# Patient Record
Sex: MALE | Race: WHITE | NOT HISPANIC OR LATINO | URBAN - METROPOLITAN AREA
[De-identification: names, ages, dates, MRNs, and addresses within clinical notes are randomized per-mention and may not be internally consistent; named-entity substitution may affect disease eponyms.]

---

## 2023-08-29 ENCOUNTER — OFFICE VISIT (OUTPATIENT)
Dept: PRIMARY CARE | Facility: CLINIC | Age: 38
End: 2023-08-29
Payer: COMMERCIAL

## 2023-08-29 ENCOUNTER — LAB (OUTPATIENT)
Dept: LAB | Facility: LAB | Age: 38
End: 2023-08-29
Payer: COMMERCIAL

## 2023-08-29 VITALS
BODY MASS INDEX: 31.95 KG/M2 | SYSTOLIC BLOOD PRESSURE: 133 MMHG | OXYGEN SATURATION: 96 % | HEIGHT: 74 IN | WEIGHT: 249 LBS | DIASTOLIC BLOOD PRESSURE: 72 MMHG | HEART RATE: 74 BPM | TEMPERATURE: 96.8 F

## 2023-08-29 DIAGNOSIS — G47.9 DIFFICULTY SLEEPING: ICD-10-CM

## 2023-08-29 DIAGNOSIS — Z76.89 ENCOUNTER TO ESTABLISH CARE: ICD-10-CM

## 2023-08-29 DIAGNOSIS — Z00.00 HEALTHCARE MAINTENANCE: ICD-10-CM

## 2023-08-29 DIAGNOSIS — F41.9 ANXIETY: ICD-10-CM

## 2023-08-29 DIAGNOSIS — F41.9 ANXIETY: Primary | ICD-10-CM

## 2023-08-29 PROCEDURE — 84443 ASSAY THYROID STIM HORMONE: CPT

## 2023-08-29 PROCEDURE — 81003 URINALYSIS AUTO W/O SCOPE: CPT

## 2023-08-29 PROCEDURE — 36415 COLL VENOUS BLD VENIPUNCTURE: CPT

## 2023-08-29 PROCEDURE — 99204 OFFICE O/P NEW MOD 45 MIN: CPT | Performed by: STUDENT IN AN ORGANIZED HEALTH CARE EDUCATION/TRAINING PROGRAM

## 2023-08-29 PROCEDURE — 80061 LIPID PANEL: CPT

## 2023-08-29 PROCEDURE — 80053 COMPREHEN METABOLIC PANEL: CPT

## 2023-08-29 PROCEDURE — 82306 VITAMIN D 25 HYDROXY: CPT

## 2023-08-29 PROCEDURE — 85025 COMPLETE CBC W/AUTO DIFF WBC: CPT

## 2023-08-29 PROCEDURE — 1036F TOBACCO NON-USER: CPT | Performed by: STUDENT IN AN ORGANIZED HEALTH CARE EDUCATION/TRAINING PROGRAM

## 2023-08-29 PROCEDURE — 84439 ASSAY OF FREE THYROXINE: CPT

## 2023-08-29 ASSESSMENT — ANXIETY QUESTIONNAIRES
GAD7 TOTAL SCORE: 10
7. FEELING AFRAID AS IF SOMETHING AWFUL MIGHT HAPPEN: SEVERAL DAYS
1. FEELING NERVOUS, ANXIOUS, OR ON EDGE: SEVERAL DAYS
6. BECOMING EASILY ANNOYED OR IRRITABLE: SEVERAL DAYS
IF YOU CHECKED OFF ANY PROBLEMS ON THIS QUESTIONNAIRE, HOW DIFFICULT HAVE THESE PROBLEMS MADE IT FOR YOU TO DO YOUR WORK, TAKE CARE OF THINGS AT HOME, OR GET ALONG WITH OTHER PEOPLE: VERY DIFFICULT
3. WORRYING TOO MUCH ABOUT DIFFERENT THINGS: SEVERAL DAYS
4. TROUBLE RELAXING: NEARLY EVERY DAY
5. BEING SO RESTLESS THAT IT IS HARD TO SIT STILL: NEARLY EVERY DAY
2. NOT BEING ABLE TO STOP OR CONTROL WORRYING: NOT AT ALL

## 2023-08-29 ASSESSMENT — ENCOUNTER SYMPTOMS
NAUSEA: 0
CHILLS: 0
FATIGUE: 0
SHORTNESS OF BREATH: 0
FREQUENCY: 0
HEADACHES: 0
COUGH: 0
PALPITATIONS: 0
DIZZINESS: 0
CONSTIPATION: 0
FEVER: 0
MYALGIAS: 0
NERVOUS/ANXIOUS: 1
RHINORRHEA: 0
VOMITING: 0
DIARRHEA: 0
LIGHT-HEADEDNESS: 0
ARTHRALGIAS: 0
ABDOMINAL PAIN: 0

## 2023-08-29 NOTE — PROGRESS NOTES
"Subjective   Patient ID: Fredi Rios is a 38 y.o. male who presents for [facility].    HPI     Patient is presenting to [facility].  Patient has been having increased anxiety that has been building up for some time.  He wanted to discuss options in regards to treatment and if there were any specifics that would be recommended for him.  He states that a lot of it has to deal with his job and uncertainty in the market.  Patient is also had some increased stress at home with his wife and children.  Patient has been in counseling in the past specifically for his anxiety as well as counseling with both him and his wife.  Overall things are stable but he still having a lot of anxious symptoms.  He feels that it is definitely affecting his sleep as well as his regular day-to-day activities.  Patient does have a history of marijuana use and has resorted to that at times here again which also causes friction with his overall stress levels.  He states that overall this does help but would like to know what alternatives would be for treatment for him.  Patient has not seen a doctor in quite some time.  He has not had any recent lab work.    Review of Systems   Constitutional:  Negative for chills, fatigue and fever.   HENT:  Negative for congestion and rhinorrhea.    Respiratory:  Negative for cough and shortness of breath.    Cardiovascular:  Negative for chest pain and palpitations.   Gastrointestinal:  Negative for abdominal pain, constipation, diarrhea, nausea and vomiting.   Genitourinary:  Negative for frequency.   Musculoskeletal:  Negative for arthralgias and myalgias.   Neurological:  Negative for dizziness, light-headedness and headaches.   Psychiatric/Behavioral:  The patient is nervous/anxious.        Objective   /72   Pulse 74   Temp 36 °C (96.8 °F)   Ht 1.886 m (6' 2.25\")   Wt 113 kg (249 lb)   SpO2 96%   BMI 31.75 kg/m²     Physical Exam  Vitals and nursing note reviewed.   Constitutional:  "      General: He is not in acute distress.     Appearance: Normal appearance. He is not ill-appearing or toxic-appearing.   Cardiovascular:      Rate and Rhythm: Normal rate and regular rhythm.      Heart sounds: Normal heart sounds.   Pulmonary:      Effort: Pulmonary effort is normal.      Breath sounds: Normal breath sounds.   Abdominal:      General: Bowel sounds are normal.      Palpations: Abdomen is soft.   Neurological:      Mental Status: He is alert.   Psychiatric:         Mood and Affect: Mood is anxious.         Assessment/Plan   Problem List Items Addressed This Visit    None  Visit Diagnoses       Anxiety    -  Primary    Relevant Orders    Thyroid Stimulating Hormone (Completed)    Thyroxine, Free (Completed)    Difficulty sleeping        Encounter to establish care        Healthcare maintenance        Relevant Orders    CBC and Auto Differential (Completed)    Comprehensive Metabolic Panel (Completed)    Lipid Panel (Completed)    Urinalysis with Reflex Microscopic (Completed)    Vitamin D, Total (Completed)    Thyroid Stimulating Hormone (Completed)    Thyroxine, Free (Completed)          History and physical examination as above.  Patient presenting to establish care.  Patient with significant anxiety as well as difficulty sleeping.  History as above.  Discussed multiple options including recommendation for continued counseling as well as for medical and medication therapy.  Current LAKEISHA-7 score of 10 with rating very difficult for regular daily activities.  Discussed medications that would be recommended.  Patient would like to defer on any medical treatment for now.  He is debating going back into counseling.  Patient is agreeable to getting lab work performed and these orders were placed.  Patient will have these performed prior to his next appointment for a wellness exam in about 1 month.  Patient will call if he wants to go on any medications in the meantime.  Patient will continue with regular  follow-up or follow-up sooner for acute concerns or complaints.

## 2023-08-30 LAB
ALANINE AMINOTRANSFERASE (SGPT) (U/L) IN SER/PLAS: 22 U/L (ref 10–52)
ALBUMIN (G/DL) IN SER/PLAS: 4.6 G/DL (ref 3.4–5)
ALKALINE PHOSPHATASE (U/L) IN SER/PLAS: 45 U/L (ref 33–120)
ANION GAP IN SER/PLAS: 12 MMOL/L (ref 10–20)
APPEARANCE, URINE: CLEAR
ASPARTATE AMINOTRANSFERASE (SGOT) (U/L) IN SER/PLAS: 20 U/L (ref 9–39)
BASOPHILS (10*3/UL) IN BLOOD BY AUTOMATED COUNT: 0.03 X10E9/L (ref 0–0.1)
BASOPHILS/100 LEUKOCYTES IN BLOOD BY AUTOMATED COUNT: 0.6 % (ref 0–2)
BILIRUBIN TOTAL (MG/DL) IN SER/PLAS: 0.4 MG/DL (ref 0–1.2)
BILIRUBIN, URINE: NEGATIVE
BLOOD, URINE: NEGATIVE
CALCIDIOL (25 OH VITAMIN D3) (NG/ML) IN SER/PLAS: 40 NG/ML
CALCIUM (MG/DL) IN SER/PLAS: 9.9 MG/DL (ref 8.6–10.6)
CARBON DIOXIDE, TOTAL (MMOL/L) IN SER/PLAS: 28 MMOL/L (ref 21–32)
CHLORIDE (MMOL/L) IN SER/PLAS: 104 MMOL/L (ref 98–107)
CHOLESTEROL (MG/DL) IN SER/PLAS: 206 MG/DL (ref 0–199)
CHOLESTEROL IN HDL (MG/DL) IN SER/PLAS: 56.2 MG/DL
CHOLESTEROL/HDL RATIO: 3.7
COLOR, URINE: NORMAL
CREATININE (MG/DL) IN SER/PLAS: 1.07 MG/DL (ref 0.5–1.3)
EOSINOPHILS (10*3/UL) IN BLOOD BY AUTOMATED COUNT: 0.18 X10E9/L (ref 0–0.7)
EOSINOPHILS/100 LEUKOCYTES IN BLOOD BY AUTOMATED COUNT: 3.5 % (ref 0–6)
ERYTHROCYTE DISTRIBUTION WIDTH (RATIO) BY AUTOMATED COUNT: 12.2 % (ref 11.5–14.5)
ERYTHROCYTE MEAN CORPUSCULAR HEMOGLOBIN CONCENTRATION (G/DL) BY AUTOMATED: 33.1 G/DL (ref 32–36)
ERYTHROCYTE MEAN CORPUSCULAR VOLUME (FL) BY AUTOMATED COUNT: 92 FL (ref 80–100)
ERYTHROCYTES (10*6/UL) IN BLOOD BY AUTOMATED COUNT: 4.91 X10E12/L (ref 4.5–5.9)
GFR MALE: 90 ML/MIN/1.73M2
GLUCOSE (MG/DL) IN SER/PLAS: 89 MG/DL (ref 74–99)
GLUCOSE, URINE: NEGATIVE MG/DL
HEMATOCRIT (%) IN BLOOD BY AUTOMATED COUNT: 45.3 % (ref 41–52)
HEMOGLOBIN (G/DL) IN BLOOD: 15 G/DL (ref 13.5–17.5)
IMMATURE GRANULOCYTES/100 LEUKOCYTES IN BLOOD BY AUTOMATED COUNT: 0.2 % (ref 0–0.9)
KETONES, URINE: NEGATIVE MG/DL
LDL: 137 MG/DL (ref 0–99)
LEUKOCYTE ESTERASE, URINE: NEGATIVE
LEUKOCYTES (10*3/UL) IN BLOOD BY AUTOMATED COUNT: 5.1 X10E9/L (ref 4.4–11.3)
LYMPHOCYTES (10*3/UL) IN BLOOD BY AUTOMATED COUNT: 1.99 X10E9/L (ref 1.2–4.8)
LYMPHOCYTES/100 LEUKOCYTES IN BLOOD BY AUTOMATED COUNT: 38.8 % (ref 13–44)
MONOCYTES (10*3/UL) IN BLOOD BY AUTOMATED COUNT: 0.44 X10E9/L (ref 0.1–1)
MONOCYTES/100 LEUKOCYTES IN BLOOD BY AUTOMATED COUNT: 8.6 % (ref 2–10)
NEUTROPHILS (10*3/UL) IN BLOOD BY AUTOMATED COUNT: 2.48 X10E9/L (ref 1.2–7.7)
NEUTROPHILS/100 LEUKOCYTES IN BLOOD BY AUTOMATED COUNT: 48.3 % (ref 40–80)
NITRITE, URINE: NEGATIVE
NRBC (PER 100 WBCS) BY AUTOMATED COUNT: 0 /100 WBC (ref 0–0)
PH, URINE: 6 (ref 5–8)
PLATELETS (10*3/UL) IN BLOOD AUTOMATED COUNT: 241 X10E9/L (ref 150–450)
POTASSIUM (MMOL/L) IN SER/PLAS: 4.6 MMOL/L (ref 3.5–5.3)
PROTEIN TOTAL: 7.3 G/DL (ref 6.4–8.2)
PROTEIN, URINE: NEGATIVE MG/DL
SODIUM (MMOL/L) IN SER/PLAS: 139 MMOL/L (ref 136–145)
SPECIFIC GRAVITY, URINE: 1.01 (ref 1–1.03)
THYROTROPIN (MIU/L) IN SER/PLAS BY DETECTION LIMIT <= 0.05 MIU/L: 2.32 MIU/L (ref 0.44–3.98)
THYROXINE (T4) FREE (NG/DL) IN SER/PLAS: 0.97 NG/DL (ref 0.78–1.48)
TRIGLYCERIDE (MG/DL) IN SER/PLAS: 66 MG/DL (ref 0–149)
UREA NITROGEN (MG/DL) IN SER/PLAS: 18 MG/DL (ref 6–23)
UROBILINOGEN, URINE: <2 MG/DL (ref 0–1.9)
VLDL: 13 MG/DL (ref 0–40)

## 2023-09-12 ENCOUNTER — LAB (OUTPATIENT)
Dept: LAB | Facility: LAB | Age: 38
End: 2023-09-12
Payer: COMMERCIAL

## 2023-09-13 LAB — TOBACCO SCREEN, URINE: NEGATIVE

## 2024-01-15 ENCOUNTER — OFFICE VISIT (OUTPATIENT)
Dept: PRIMARY CARE | Facility: CLINIC | Age: 39
End: 2024-01-15
Payer: COMMERCIAL

## 2024-01-15 VITALS
DIASTOLIC BLOOD PRESSURE: 84 MMHG | WEIGHT: 248 LBS | OXYGEN SATURATION: 97 % | SYSTOLIC BLOOD PRESSURE: 124 MMHG | HEART RATE: 76 BPM | HEIGHT: 75 IN | BODY MASS INDEX: 30.84 KG/M2 | TEMPERATURE: 97.5 F

## 2024-01-15 DIAGNOSIS — R41.840 DECREASED ATTENTION SPAN: ICD-10-CM

## 2024-01-15 DIAGNOSIS — E78.2 MIXED HYPERLIPIDEMIA: Primary | ICD-10-CM

## 2024-01-15 DIAGNOSIS — R41.840 DIFFICULTY CONCENTRATING: ICD-10-CM

## 2024-01-15 PROCEDURE — 1036F TOBACCO NON-USER: CPT | Performed by: STUDENT IN AN ORGANIZED HEALTH CARE EDUCATION/TRAINING PROGRAM

## 2024-01-15 PROCEDURE — 99213 OFFICE O/P EST LOW 20 MIN: CPT | Performed by: STUDENT IN AN ORGANIZED HEALTH CARE EDUCATION/TRAINING PROGRAM

## 2024-01-15 ASSESSMENT — ENCOUNTER SYMPTOMS
CHILLS: 0
FATIGUE: 0
DECREASED CONCENTRATION: 1
FEVER: 0
ARTHRALGIAS: 0
ABDOMINAL PAIN: 0
NAUSEA: 0
NERVOUS/ANXIOUS: 0
MYALGIAS: 0
VOMITING: 0
SHORTNESS OF BREATH: 0
DIARRHEA: 0
HYPERACTIVE: 0

## 2024-01-15 NOTE — PROGRESS NOTES
"Subjective   Patient ID: Fredi Rios is a 38 y.o. male who presents for Establish Care (Adhd medication possibility).    HPI     He did end up quitting marijuana for the past few months.  He quit cigarettes about 2 weeks ago.  He states that this has really helped his overall anxiety in the short term.  He has been in counseling and at the time they thought that he had ADHD.  He has a hard time focusing on certain tasks and stuff at work.  He does not feel that this is the same anxiety that he had in the past.  Hard time paying attention when people are talking to him about something that he is not interested in. 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  He thinks his last evaluation for ADHD was in college but he was not on any medication.  He is interested in getting tested.      Review of Systems   Constitutional:  Negative for chills, fatigue and fever.   HENT:  Negative for congestion and postnasal drip.    Respiratory:  Negative for shortness of breath.    Cardiovascular:  Negative for chest pain.   Gastrointestinal:  Negative for abdominal pain, diarrhea, nausea and vomiting.   Musculoskeletal:  Negative for arthralgias and myalgias.   Psychiatric/Behavioral:  Positive for decreased concentration. The patient is not nervous/anxious and is not hyperactive.        Objective   /84   Pulse 76   Temp 36.4 °C (97.5 °F)   Ht 1.905 m (6' 3\")   Wt 112 kg (248 lb)   SpO2 97%   BMI 31.00 kg/m²     Physical Exam  Vitals and nursing note reviewed.   Constitutional:       General: He is not in acute distress.     Appearance: Normal appearance. He is normal weight. He is not ill-appearing or toxic-appearing.   HENT:      Head: Normocephalic and atraumatic.   Cardiovascular:      Rate and Rhythm: Normal rate and regular rhythm.      Heart sounds: Normal heart sounds.   Pulmonary:      Effort: Pulmonary effort is normal.      Breath sounds: Normal breath sounds.   Skin:     General: " Skin is warm and dry.   Neurological:      General: No focal deficit present.      Mental Status: He is alert. Mental status is at baseline. He is disoriented.      Cranial Nerves: No cranial nerve deficit.   Psychiatric:         Mood and Affect: Mood normal.         Behavior: Behavior normal.         Assessment/Plan   Problem List Items Addressed This Visit    None  Visit Diagnoses         Codes    Mixed hyperlipidemia    -  Primary E78.2    Relevant Orders    Lipid Panel    Difficulty concentrating     R41.840    Relevant Orders    Referral to Psychiatry    Decreased attention Span     R41.840    Relevant Orders    Referral to Psychiatry          History and physical examination as above.  Referral placed to psychiatry for testing for ADHD.  Patient did previously have a diagnosis of ADHD, but has not had any medications since he was last in college.  He has not been on any sort of recent medications.  Patient will continue with follow-up with them for this.  Also placed order for repeat cholesterol level to be done in March.  Patient will make sure that he is fasting prior to this test especially with changing of his diet recently.  Patient will otherwise continue with regular follow-up.  He will come in sooner for acute concerns or complaints.

## 2024-01-15 NOTE — PATIENT INSTRUCTIONS
I went ahead and put in an order for blood testing to be done in March.  Please make sure that you are fasting for this lab work.    I went ahead and put in a referral to psychiatry for testing for ADHD.  Please call in the next 1 to 2 weeks if you do not hear back from their office.    Please call with any additional questions or concerns.    Thank you

## 2024-01-23 ENCOUNTER — OFFICE VISIT (OUTPATIENT)
Dept: BEHAVIORAL HEALTH | Facility: CLINIC | Age: 39
End: 2024-01-23
Payer: COMMERCIAL

## 2024-01-23 VITALS
HEART RATE: 67 BPM | RESPIRATION RATE: 16 BRPM | WEIGHT: 240.3 LBS | DIASTOLIC BLOOD PRESSURE: 78 MMHG | SYSTOLIC BLOOD PRESSURE: 118 MMHG | TEMPERATURE: 98.3 F | HEIGHT: 75 IN | BODY MASS INDEX: 29.88 KG/M2

## 2024-01-23 DIAGNOSIS — R41.840 DECREASED ATTENTION SPAN: ICD-10-CM

## 2024-01-23 DIAGNOSIS — R41.840 DIFFICULTY CONCENTRATING: ICD-10-CM

## 2024-01-23 DIAGNOSIS — F90.9 ATTENTION DEFICIT HYPERACTIVITY DISORDER (ADHD), UNSPECIFIED ADHD TYPE: ICD-10-CM

## 2024-01-23 PROCEDURE — 99214 OFFICE O/P EST MOD 30 MIN: CPT | Performed by: PSYCHIATRY & NEUROLOGY

## 2024-01-23 PROCEDURE — 1036F TOBACCO NON-USER: CPT | Performed by: PSYCHIATRY & NEUROLOGY

## 2024-01-23 PROCEDURE — 80307 DRUG TEST PRSMV CHEM ANLYZR: CPT

## 2024-01-23 RX ORDER — METHYLPHENIDATE HYDROCHLORIDE 18 MG/1
18 TABLET ORAL DAILY
Qty: 30 TABLET | Refills: 0 | Status: SHIPPED | OUTPATIENT
Start: 2024-01-23 | End: 2024-01-24 | Stop reason: SDUPTHER

## 2024-01-23 NOTE — PROGRESS NOTES
"Outpatient Psychiatry    Subjective   Fredi Rios, a 38 y.o. male, presenting to Psychiatry for evaluation.  Patient is referred by Eusebio Isabel DO for possible ADHD.      Chief Complaint: \"I think I have had ADHD since I was young\"    HPI:    ADHD diagnosed as young child and took Ritalin as a child (ages 5-9)   Gets distracted, wife feels he is not paying attention, not very organized (had an assistant which helped a great deal with organization) which effects him at work, puts things off, difficulty sitting still   After second child marital issues started and he and his wife believe the issues surround his ADHD (I.e. 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         Psychiatric Review Of Systems:  Depressive Symptoms: negative  Manic Symptoms: negative  Anxiety Symptoms: Negative  Psychotic Symptoms: negative  Other Symptoms:    Current Medications:  No current outpatient medications on file.  Emile's  Wart, \"makes me feel better\"    Medical History:  None    All:  NKDA    Past Psychiatric History:   Diagnoses:  ADHD as young boy (ages [age]) college DX  ADHD, didn't follow through   Previous Psychiatrist: unknown  Therapy: couples counseling after 2nd child; still going to continue   Current psychiatric medications: none  Past psychiatric medications: ritalin as a child ages [age]  (remembers taking it twice a day)  Hospitalizations: none  Suicide attempts: none  Family psychiatric history: mother passed age 19, dad Lasik Eye Surgeon age 71   Siblings: two younger twin sisters, step sister      Social History:   Currently lives: with wife and three " children   Education: [employer]; stuggled academically, didn't go to class often, Major Microbiology, Minor in Chemistry   History of Learning Problem:  attention problems as child  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    OARRS:  No data recorded  I have personally reviewed the OARRS report for Fredi Rios. I have considered the risks of abuse, dependence, addiction and diversion and I believe that it is clinically appropriate for Fredi Rios to be prescribed this medication    Is the patient prescribed a combination of a benzodiazepine and opioid?  No    Last Urine Drug Screen / ordered today: Yes  No results found for this or any previous visit (from the past 8760 hour(s)).  Results pending (urine taken today)    Controlled Substance Agreement:  Date of the Last Agreement: today  Reviewed Controlled Substance Agreement including but not limited to the benefits, risks, and alternatives to treatment with a Controlled Substance medication(s).    Stimulants:   What is the patient's goal of therapy? Increased focus, increased organizational skills, increased attention to work  and  home life      Objective   Mental Status Exam  Appearance: Casually dressed, well-groomed  Attitude: Calm, cooperative, and engaged in conversation.  Behavior: Appropriate eye contact.   Motor Activity: No psychomotor  "agitation or retardation. No abnormal movements, tremors or tics. No evidence of extrapyramidal symptoms or tardive dyskinesia.  Speech: Regular rate, rhythm, volume. Spontaneous, no pressured speech.  Mood: \"pretty good\"  Affect: Euthymic, full range, mood congruent.  Thought Process: Linear, logical, and goal-directed. No loose associations or gross thought disorganization.  Thought Content: Denied current suicidal ideation or thoughts of harm to self, denied homicidal ideation or thoughts of harm to others. No delusional thinking elicited. No perseverations or obsessions identified.   Perception: Did not endorse auditory or visual hallucinations, did not appear to be responding to hallucinatory stimuli.   Cognition: Alert, oriented x3. Preserved attention span and concentration, recent and remote memory. Adequate fund of knowledge. No deficits in language.   Insight: Fair, in regards to understanding mental health condition  Judgement: Fair      Vitals:    01/23/24 0929   BP: 118/78   Pulse: 67   Resp: 16   Temp: 36.8 °C (98.3 °F)        Risk Assessment:  Risk of harm to self: low risk    Risk of harm to others: low risk    DX:  ADHD, combined type       Plan/Recommendations:  Medications: start Concerta 18 mg PO daily  Orders: urine tox screen today  Follow up: 3-4 weeks  Call  Psychiatry at [phone] with issues.  For [facility] residents, [employer] is a 24/7 hotline you can call for assistance [[phone]]. Please call 647/591 or go to your closest Emergency Room if you feel unsafe. This includes thoughts of hurting yourself or anyone else, or having other troubles such as hearing voices, seeing visions, or having new and scary thoughts about the people around you.    Review with patient: Treatment plan reviewed with the patient.  Medication risks/benefit reviewed with the patient  Time Spent: 60 mins    Rosa Sandhu MD  "

## 2024-01-24 ENCOUNTER — TELEPHONE (OUTPATIENT)
Dept: BEHAVIORAL HEALTH | Facility: CLINIC | Age: 39
End: 2024-01-24
Payer: COMMERCIAL

## 2024-01-24 DIAGNOSIS — F90.9 ATTENTION DEFICIT HYPERACTIVITY DISORDER (ADHD), UNSPECIFIED ADHD TYPE: ICD-10-CM

## 2024-01-24 LAB
AMPHETAMINES UR QL SCN: NORMAL
BARBITURATES UR QL SCN: NORMAL
BENZODIAZ UR QL SCN: NORMAL
BZE UR QL SCN: NORMAL
CANNABINOIDS UR QL SCN: NORMAL
FENTANYL+NORFENTANYL UR QL SCN: NORMAL
OPIATES UR QL SCN: NORMAL
OXYCODONE+OXYMORPHONE UR QL SCN: NORMAL
PCP UR QL SCN: NORMAL

## 2024-01-29 RX ORDER — METHYLPHENIDATE HYDROCHLORIDE 18 MG/1
18 TABLET ORAL DAILY
Qty: 30 TABLET | Refills: 0 | Status: SHIPPED | OUTPATIENT
Start: 2024-01-29 | End: 2024-02-28

## 2024-02-12 ENCOUNTER — TELEMEDICINE (OUTPATIENT)
Dept: BEHAVIORAL HEALTH | Facility: CLINIC | Age: 39
End: 2024-02-12
Payer: COMMERCIAL

## 2024-02-12 DIAGNOSIS — F90.9 ATTENTION DEFICIT HYPERACTIVITY DISORDER (ADHD), UNSPECIFIED ADHD TYPE: ICD-10-CM

## 2024-02-12 PROCEDURE — 99213 OFFICE O/P EST LOW 20 MIN: CPT | Performed by: PSYCHIATRY & NEUROLOGY

## 2024-02-12 PROCEDURE — 1036F TOBACCO NON-USER: CPT | Performed by: PSYCHIATRY & NEUROLOGY

## 2024-02-12 RX ORDER — METHYLPHENIDATE HYDROCHLORIDE 18 MG/1
18 TABLET ORAL DAILY
Qty: 30 TABLET | Refills: 0 | Status: SHIPPED | OUTPATIENT
Start: 2024-03-28 | End: 2024-04-27

## 2024-02-12 RX ORDER — METHYLPHENIDATE HYDROCHLORIDE 18 MG/1
18 TABLET ORAL DAILY
Qty: 30 TABLET | Refills: 0 | Status: SHIPPED | OUTPATIENT
Start: 2024-02-28 | End: 2024-03-29

## 2024-02-12 NOTE — PROGRESS NOTES
"  Subjective   Fredi Rios, a 38 y.o. male, presenting to Psychiatry for evaluation.  Patient is referred by Eusebio Isabel DO for possible ADHD.       Chief Complaint: \"I am doing really well\"     HPI:     Patient reports that he started the Concerta on  1/25/2024 and  feels it is really helping a lot.  He said he has been much more productive, more focused and has been procrastinating much less.  He is getting tasks done more  easily.  His appetite is fine.  He feels as if his sleep has improved.  He has lost some weight as well because he has been able to focus more on daily workouts.  He feels the medication wears off around 5 or 6pm.  He denies depressive symptoms.  Denies anxiety. Denies manic or psychotic symptoms.            Psychiatric Review Of Systems:  Depressive Symptoms: negative  Manic Symptoms: negative  Anxiety Symptoms: Negative  Psychotic Symptoms: negative  Other Symptoms:     Current Medications:  No current outpatient medications on file.  Emile's  Wart, \"makes me feel better\"     Medical History:  None     All:  NKDA     Past Psychiatric History:   Diagnoses:  ADHD as young boy (ages 4-9) college DX  ADHD, didn't follow through   Previous Psychiatrist: unknown  Therapy: couples counseling after 2nd child; still going to continue   Current psychiatric medications: none  Past psychiatric medications: ritalin as a child ages [age]  (remembers taking it twice a day)  Hospitalizations: none  Suicide attempts: none  Family psychiatric history: mother passed age 19, dad Lasik Eye Surgeon age 71   Siblings: two younger twin sisters, step sister       Social History:   Currently lives: with wife and three children   Education: [employer] 2008; stuggled academically, didn't go to class often, Major Microbiology, Minor in Chemistry   History of Learning Problem:  attention problems as child  Work/Finances:  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     OARRS:  Office Visit on 01/23/2024   Component Date Value Ref Range Status    Amphetamine Screen, Urine 01/23/2024 Presumptive Negative  Presumptive Negative Final    CUTOFF LEVEL: 500 NG/ML   Cross-reactivity has been reported with high concentrations   of the following drugs: buproprion, chloroquine, chlorpromazine,   ephedrine, mephentermine, fenfluramine, phentermine,   phenylpropanolamine, pseudoephedrine, and propranolol.    Barbiturate Screen, Urine 01/23/2024 Presumptive Negative  Presumptive Negative Final    CUTOFF LEVEL: 200 NG/ML    Benzodiazepines Screen, Urine 01/23/2024 Presumptive Negative  Presumptive Negative Final    CUTOFF LEVEL: 200 NG/ML    Cannabinoid Screen, Urine 01/23/2024 Presumptive Negative  Presumptive Negative Final    CUTOFF LEVEL: 50 NG/ML    Cocaine Metabolite Screen, Urine 01/23/2024 Presumptive Negative  Presumptive Negative Final    CUTOFF LEVEL: 150 NG/ML    Fentanyl Screen, Urine 01/23/2024 Presumptive Negative  Presumptive Negative Final    CUTOFF LEVEL: 5 NG/ML    Opiate Screen, Urine 01/23/2024 Presumptive Negative  Presumptive Negative Final    CUTOFF LEVEL: 300 NG/ML  The opiate screen does not detect fentanyl, meperidine, or   tramadol. Oxycodone is not consistently detected (refer to  Oxycodone Screen, Urine result).    Oxycodone Screen, Urine 01/23/2024 Presumptive Negative  Presumptive Negative Final    CUTOFF LEVEL:  "100 NG/ML  This test will accurately detect both oxycodone and oxymorphone.    PCP Screen, Urine 01/23/2024 Presumptive Negative  Presumptive Negative Final    CUTOFF LEVEL:  25 NG/ML  Cross-reactivity has been reported with dextromethorphan.      I have personally reviewed the OARRS report for Fredi Rios. I have considered the risks of abuse, dependence, addiction and diversion and I believe that it is clinically appropriate for Fredi Rios to be prescribed this medication     Is the patient prescribed a combination of a benzodiazepine and opioid?  No     Last Urine Drug Screen / ordered today:  see above     Controlled Substance Agreement:  Date of the Last Agreement: 1/23/2024  Reviewed Controlled Substance Agreement including but not limited to the benefits, risks, and alternatives to treatment with a Controlled Substance medication(s).     Stimulants:   What is the patient's goal of therapy? Increased focus, increased organizational skills, increased attention to work  and  home life              Objective   Mental Status Exam  Appearance: Casually dressed, well-groomed  Attitude: Calm, cooperative, and engaged in conversation.  Behavior: Appropriate eye contact.   Motor Activity: No psychomotor agitation or retardation. No abnormal movements, tremors or tics. No evidence of extrapyramidal symptoms or tardive dyskinesia.  Speech: Regular rate, rhythm, volume. Spontaneous, no pressured speech.  Mood: \"pretty good\"  Affect: Euthymic, full range, mood congruent.  Thought Process: Linear, logical, and goal-directed. No loose associations or gross thought disorganization.  Thought Content: Denied current suicidal ideation or thoughts of harm to self, denied homicidal ideation or thoughts of harm to others. No delusional thinking elicited. No perseverations or obsessions identified.   Perception: Did not endorse auditory or visual hallucinations, did not appear to be responding to hallucinatory stimuli. "   Cognition: Alert, oriented x3. Preserved attention span and concentration, recent and remote memory. Adequate fund of knowledge. No deficits in language.   Insight: Fair, in regards to understanding mental health condition  Judgement: Fair            Vitals:     01/23/24 0929   BP: 118/78   Pulse: 67   Resp: 16   Temp: 36.8 °C (98.3 °F)         Risk Assessment:  Risk of harm to self: low risk     Risk of harm to others: low risk     DX:  ADHD, combined type   Patient doing much better on Concerta 18 mg PO daily with improved  focus, decreased procrastination and improved organization.  No side effects  noted.          Plan/Recommendations:  Medications: continue Concerta 18 mg PO daily  Follow up: 8  weeks  Call  Psychiatry at [phone] with issues.  For [facility] residents, [employer] is a 24/7 hotline you can call for assistance [[phone]]. Please call [phone] or go to your closest Emergency Room if you feel unsafe. This includes thoughts of hurting yourself or anyone else, or having other troubles such as hearing voices, seeing visions, or having new and scary thoughts about the people around you.     Review with patient: Treatment plan reviewed with the patient.  Medication risks/benefit reviewed with the patient  Time Spent: 60 mins     Rosa Sandhu MD

## 2024-05-06 ENCOUNTER — APPOINTMENT (OUTPATIENT)
Dept: BEHAVIORAL HEALTH | Facility: CLINIC | Age: 39
End: 2024-05-06
Payer: COMMERCIAL

## 2024-05-14 ENCOUNTER — TELEMEDICINE (OUTPATIENT)
Dept: BEHAVIORAL HEALTH | Facility: CLINIC | Age: 39
End: 2024-05-14
Payer: COMMERCIAL

## 2024-05-14 DIAGNOSIS — F90.9 ATTENTION DEFICIT HYPERACTIVITY DISORDER (ADHD), UNSPECIFIED ADHD TYPE: ICD-10-CM

## 2024-05-14 DIAGNOSIS — F41.9 ANXIETY: ICD-10-CM

## 2024-05-14 PROCEDURE — 99214 OFFICE O/P EST MOD 30 MIN: CPT | Performed by: PSYCHIATRY & NEUROLOGY

## 2024-05-14 RX ORDER — FLUOXETINE 10 MG/1
10 CAPSULE ORAL DAILY
Qty: 30 CAPSULE | Refills: 0 | Status: SHIPPED | OUTPATIENT
Start: 2024-05-14 | End: 2024-06-13

## 2024-05-14 RX ORDER — METHYLPHENIDATE HYDROCHLORIDE 18 MG/1
18 TABLET ORAL DAILY
Qty: 30 TABLET | Refills: 0 | Status: SHIPPED | OUTPATIENT
Start: 2024-05-14 | End: 2024-06-13

## 2024-05-14 NOTE — PROGRESS NOTES
"  Subjective   Fredi Rios, a 38 y.o. male, presenting to Psychiatry for evaluation.  Patient is referred by Eusebio Isabel DO for possible ADHD.       Chief Complaint: \"I am doing really well\"     HPI:       Patient reports he is doing okay but  is having a great deal  of anxiety.  He  reports stress at   work, stress  at home with the three young boys and some marital stress.  Patient reports he    often feels on edge and feel like he has a \"pit\" in the middle of his stomach.  He feels his stress   goes up and down usually having to do with work.      He has been going to a personal counselor and he and his wife have had a few marriage counseling   sessions.  He reports that he and his wife have  been fighting more frequently.      In the past he tried Buspar for anxiety  but it did  not help.      Patient reports his appetite is good but his sleep varies.  When he is stressed he  does  not sleep   as  well and often has trouble falling asleep due to excessive worrying and his mind  racing with   thoughts.       Patient has taken Delta 8 and delta 10 in the  form of vaping for sleep which his  wife does not like.      Patient reports that he started the Concerta on  1/25/2024 and  feels it is really helping a lot.  He   said he has been much more productive, more focused and has been procrastinating much less.  He   is getting tasks done more  easily.  He has lost some weight as well because he has been able to   focus more on daily workouts.  He feels the medication wears off around 5 or 6pm.  He denies   depressive symptoms.  Denies manic or psychotic symptoms.     No side effects reported.           Psychiatric Review Of Systems:  Depressive Symptoms: negative  Manic Symptoms: negative  Anxiety Symptoms: worrying a great deal, feels on edge, feels stressed out  Psychotic Symptoms: negative  Other Symptoms:  ADHD - improved     Current Medications:  Concerta 18 mg PO daily   [employer] [place], \"makes me feel " "better\"     Medical History:  None     All:  NKDA     Past Psychiatric History:   Diagnoses:  ADHD as young boy (ages [age]) college DX  ADHD, didn't follow through   Previous Psychiatrist: unknown  Therapy: couples counseling after 2nd child; still going to continue;  now in counseling  Counseling since Jan bi-weekly - anxiety,   Current psychiatric medications: none  Past psychiatric medications: ritalin as a child ages [age]  (remembers taking it twice a day); Buspar lost 20 lbs,   Hospitalizations: none  Suicide attempts: none  Family psychiatric history: mother passed age 19, dad Lasik Eye Surgeon age 71 - was Prozac and   Siblings: two younger twin sisters, step sister       Social History:   Currently lives: with wife and three children   Education: [employer] 2008; stuggled academically, didn't go to class often, Major Microbiology, Minor in Chemistry   History of Learning Problem:  attention problems as child  Work/Finances:  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     OARRS:          Office Visit on 01/23/2024   Component Date Value Ref Range Status    Amphetamine Screen, Urine 01/23/2024 Presumptive Negative  Presumptive Negative Final     CUTOFF LEVEL: 500 NG/ML   Cross-reactivity has been reported with high concentrations   of the following drugs: buproprion, chloroquine, chlorpromazine,   ephedrine, " mephentermine, fenfluramine, phentermine,   phenylpropanolamine, pseudoephedrine, and propranolol.    Barbiturate Screen, Urine 01/23/2024 Presumptive Negative  Presumptive Negative Final     CUTOFF LEVEL: 200 NG/ML    Benzodiazepines Screen, Urine 01/23/2024 Presumptive Negative  Presumptive Negative Final     CUTOFF LEVEL: 200 NG/ML    Cannabinoid Screen, Urine 01/23/2024 Presumptive Negative  Presumptive Negative Final     CUTOFF LEVEL: 50 NG/ML    Cocaine Metabolite Screen, Urine 01/23/2024 Presumptive Negative  Presumptive Negative Final     CUTOFF LEVEL: 150 NG/ML    Fentanyl Screen, Urine 01/23/2024 Presumptive Negative  Presumptive Negative Final     CUTOFF LEVEL: 5 NG/ML    Opiate Screen, Urine 01/23/2024 Presumptive Negative  Presumptive Negative Final     CUTOFF LEVEL: 300 NG/ML  The opiate screen does not detect fentanyl, meperidine, or   tramadol. Oxycodone is not consistently detected (refer to  Oxycodone Screen, Urine result).    Oxycodone Screen, Urine 01/23/2024 Presumptive Negative  Presumptive Negative Final     CUTOFF LEVEL: 100 NG/ML  This test will accurately detect both oxycodone and oxymorphone.    PCP Screen, Urine 01/23/2024 Presumptive Negative  Presumptive Negative Final     CUTOFF LEVEL:  25 NG/ML  Cross-reactivity has been reported with dextromethorphan.      I have personally reviewed the OARRS report for Fredi Rios. I have considered the risks of abuse, dependence, addiction and diversion and I believe that it is clinically appropriate for Fredi Rios to be prescribed this medication     Is the patient prescribed a combination of a benzodiazepine and opioid?  No     Last Urine Drug Screen -  see above     Controlled Substance Agreement:  Date of the Last Agreement: 1/23/2024  Reviewed Controlled Substance Agreement including but not limited to the benefits, risks, and alternatives to treatment with a Controlled Substance medication(s).     Stimulants:   What is the patient's goal  "of therapy? Increased focus, increased organizational skills, increased attention to work  and  home life              Objective   Mental Status Exam  Appearance: Casually dressed, well-groomed  Attitude: Calm, cooperative, and engaged in conversation.  Behavior: Appropriate eye contact.   Motor Activity: No psychomotor agitation or retardation. No abnormal movements, tremors or tics. No evidence of extrapyramidal symptoms or tardive dyskinesia.  Speech: Regular rate, rhythm, volume. Spontaneous, no pressured speech.  Mood: \"okay\"  Affect: Euthymic, full range, mood congruent.  Thought Process: Linear, logical, and goal-directed. No loose associations or gross thought disorganization.  Thought Content: Denied current suicidal ideation or thoughts of harm to self, denied homicidal ideation or thoughts of harm to others. No delusional thinking elicited. No perseverations or obsessions identified.   Perception: Did not endorse auditory or visual hallucinations, did not appear to be responding to hallucinatory stimuli.   Cognition: Alert, oriented x3. Preserved attention span and concentration, recent and remote memory. Adequate fund of knowledge. No deficits in language.   Insight: Fair, in regards to understanding mental health condition  Judgement: Fair              Vitals:     01/23/24 0929   BP: 118/78   Pulse: 67   Resp: 16   Temp: 36.8 °C (98.3 °F)         Risk Assessment:  Risk of harm to self: low risk     Risk of harm to others: low risk     DX:  ADHD, combined type   LAKEISHA    Patient doing much better on Concerta 18 mg PO daily with improved  focus, decreased procrastination and improved organization.  Patient now reporting a great deal of anxiety due to work, kids and marital strife.  He is counseling twice a month for anxiety.  Would like to add medication for anxiety.  No side effects  noted.    Will start Prozac 10 mg PO daily.  Reviewed possible side effects.       Plan/Recommendations:  Medications: " continue Concerta 18 mg PO daily  Start Prozac 10 mg PO daily  Follow up: 8  weeks  Call  Psychiatry at [phone] with issues.  For [facility] residents, [place] is a 24/7 hotline you can call for assistance [[phone]]. Please call [phone] or go to your closest Emergency Room if you feel unsafe. This includes thoughts of hurting yourself or anyone else, or having other troubles such as hearing voices, seeing visions, or having new and scary thoughts about the people around you.     Review with patient: Treatment plan reviewed with the patient.  Medication risks/benefit reviewed with the patient     Rosa Sandhu MD

## 2024-06-04 ENCOUNTER — TELEMEDICINE (OUTPATIENT)
Dept: BEHAVIORAL HEALTH | Facility: CLINIC | Age: 39
End: 2024-06-04
Payer: COMMERCIAL

## 2024-06-04 DIAGNOSIS — F41.9 ANXIETY: ICD-10-CM

## 2024-06-04 PROCEDURE — 99213 OFFICE O/P EST LOW 20 MIN: CPT | Performed by: PSYCHIATRY & NEUROLOGY

## 2024-06-04 NOTE — PROGRESS NOTES
"  Subjective   Fredi Rios, a 38 y.o. male, presenting to Psychiatry for evaluation.  Patient is referred by Eusebio Isabel DO for possible ADHD.             \"I am doing ok\"    Just started Prozac yesterday     Had been taking [employer] Wart and knew he should not take it with SSRIs   so he weaned himself off before starting.    Patient feels like he already noticed a little difference.  He feels more calm and it   \"takes the edge off\"    Things seem to be better with his wife    Sleep - it is still a little hard to fall asleep    Going on vacation next week so he is excited      He denies any depressive symptoms.  Denies manic or psychotic symptoms.      No side effects reported.        Psychiatric Review Of Systems:  Depressive Symptoms: negative  Manic Symptoms: negative  Anxiety Symptoms: worrying a great deal, feels on edge, feels stressed out  Psychotic Symptoms: negative  Other Symptoms:  ADHD - improved     Current Medications:  Concerta 18 mg PO daily   Em[employer]  Wart, \"makes me feel better\"     Medical History:  None     All:  NKDA     Past Psychiatric History:   Diagnoses:  ADHD as young boy (ages [age]) college DX  ADHD, didn't follow through   Previous Psychiatrist: unknown  Therapy: couples counseling after 2nd child; still going to continue;  now in counseling  Counseling since Jan bi-weekly - anxiety,   Current psychiatric medications: none  Past psychiatric medications: ritalin as a child ages 5-8  (remembers taking it twice a day); Buspar lost 20 lbs,   Hospitalizations: none  Suicide attempts: none  Family psychiatric history: mother passed age 19, dad Lasik Eye Surgeon age 71 - was Prozac and   Siblings: two younger twin sisters, step sister       Social History:   Currently lives: with wife and three children   Education: [employer]; stuggled academically, didn't go to class often, Major Microbiology, Minor in Chemistry   History of Learning Problem:  attention problems as " Take your Blood pressure daily, record on log provided and bring to your next CHF clinic visit. If your top number of is running above 140 or bottom number is above 90 then please call and notify the CHF clinic.     • Weigh yourself every morning. Notify the CHF CLINIC if your weight increases 2 to 3 pounds in 1-2 days or 5 pounds in one week.   • Notify the CHF CLINIC with worsening signs of heart failure: increased shortness of breath, increased swelling in your legs, decreased appetite.   • Adhere to a low sodium diet (no more than 600mg of sodium per meal), and limit fluid intake to no more than 64 ounces each day.   • Take your medications as instructed. Please DO NOT stop taking your medications without specific instructions from your physician.     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     OARRS:                Office Visit on 01/23/2024   Component Date Value Ref Range Status    Amphetamine Screen, Urine 01/23/2024 Presumptive Negative  Presumptive Negative Final     CUTOFF LEVEL: 500 NG/ML   Cross-reactivity has been reported with high concentrations   of the following drugs: buproprion, chloroquine, chlorpromazine,   ephedrine, mephentermine, fenfluramine, phentermine,   phenylpropanolamine, pseudoephedrine, and propranolol.    Barbiturate Screen, Urine 01/23/2024 Presumptive Negative  Presumptive Negative Final     CUTOFF LEVEL: 200 NG/ML    Benzodiazepines Screen, Urine 01/23/2024 Presumptive Negative  Presumptive Negative Final     CUTOFF LEVEL: 200 NG/ML    Cannabinoid Screen, Urine 01/23/2024 Presumptive Negative  Presumptive Negative Final     CUTOFF LEVEL: 50 NG/ML    Cocaine Metabolite Screen, Urine 01/23/2024 Presumptive Negative  Presumptive Negative Final     CUTOFF LEVEL: 150 NG/ML    Fentanyl Screen, Urine 01/23/2024 Presumptive Negative  Presumptive Negative Final     CUTOFF LEVEL: 5 NG/ML    Opiate Screen, Urine 01/23/2024 Presumptive Negative  Presumptive Negative Final     CUTOFF LEVEL: 300 NG/ML  The opiate screen does not detect fentanyl, meperidine, or   tramadol. Oxycodone is not consistently detected (refer  "to  Oxycodone Screen, Urine result).    Oxycodone Screen, Urine 01/23/2024 Presumptive Negative  Presumptive Negative Final     CUTOFF LEVEL: 100 NG/ML  This test will accurately detect both oxycodone and oxymorphone.    PCP Screen, Urine 01/23/2024 Presumptive Negative  Presumptive Negative Final     CUTOFF LEVEL:  25 NG/ML  Cross-reactivity has been reported with dextromethorphan.      I have personally reviewed the OARRS report for Fredi Rios. I have considered the risks of abuse, dependence, addiction and diversion and I believe that it is clinically appropriate for Fredi Rios to be prescribed this medication     Is the patient prescribed a combination of a benzodiazepine and opioid?  No     Last Urine Drug Screen -  see above     Controlled Substance Agreement:  Date of the Last Agreement: 1/23/2024  Reviewed Controlled Substance Agreement including but not limited to the benefits, risks, and alternatives to treatment with a Controlled Substance medication(s).     Stimulants:   What is the patient's goal of therapy? Increased focus, increased organizational skills, increased attention to work  and  home life              Objective   Mental Status Exam  Appearance: Casually dressed, well-groomed  Attitude: Calm, cooperative, and engaged in conversation.  Behavior: Appropriate eye contact.   Motor Activity: No psychomotor agitation or retardation. No abnormal movements, tremors or tics. No evidence of extrapyramidal symptoms or tardive dyskinesia.  Speech: Regular rate, rhythm, volume. Spontaneous, no pressured speech.  Mood: \"okay\"  Affect: Euthymic, full range, mood congruent.  Thought Process: Linear, logical, and goal-directed. No loose associations or gross thought disorganization.  Thought Content: Denied current suicidal ideation or thoughts of harm to self, denied homicidal ideation or thoughts of harm to others. No delusional thinking elicited. No perseverations or obsessions identified. "   Perception: Did not endorse auditory or visual hallucinations, did not appear to be responding to hallucinatory stimuli.   Cognition: Alert, oriented x3. Preserved attention span and concentration, recent and remote memory. Adequate fund of knowledge. No deficits in language.   Insight: Fair, in regards to understanding mental health condition  Judgement: Fair              Vitals:     01/23/24 0929   BP: 118/78   Pulse: 67   Resp: 16   Temp: 36.8 °C (98.3 °F)         Risk Assessment:  Risk of harm to self: low risk     Risk of harm to others: low risk     DX:  ADHD, combined type   LAKEISHA     Patient doing much better on Concerta 18 mg PO daily with improved  focus, decreased procrastination and improved organization.  Patient started Prozac  yesterday after he weaned himself off of [employer] Wart.  He already feels a little better, more calm. He is in counseling twice a month for anxiety.      Reviewed possible side effects/risks/benefits.       Plan/Recommendations:  Medications: continue Concerta 18 mg PO daily and Prozac 10 mg PO daily  Follow up: 3  weeks  Call  Psychiatry at [phone] with issues.  For [facility] residents, [facility] is a 24/7 hotline you can call for assistance [[phone]]. Please call [phone] or go to your closest Emergency Room if you feel unsafe. This includes thoughts of hurting yourself or anyone else, or having other troubles such as hearing voices, seeing visions, or having new and scary thoughts about the people around you.     Review with patient: Treatment plan reviewed with the patient.  Medication risks/benefit reviewed with the patient

## 2024-06-24 ENCOUNTER — APPOINTMENT (OUTPATIENT)
Dept: BEHAVIORAL HEALTH | Facility: CLINIC | Age: 39
End: 2024-06-24
Payer: COMMERCIAL

## 2024-06-26 ENCOUNTER — TELEMEDICINE (OUTPATIENT)
Dept: BEHAVIORAL HEALTH | Facility: CLINIC | Age: 39
End: 2024-06-26
Payer: COMMERCIAL

## 2024-06-26 DIAGNOSIS — F90.0 ATTENTION DEFICIT HYPERACTIVITY DISORDER (ADHD), PREDOMINANTLY INATTENTIVE TYPE: ICD-10-CM

## 2024-06-26 DIAGNOSIS — F41.9 ANXIETY: ICD-10-CM

## 2024-06-26 PROCEDURE — 99213 OFFICE O/P EST LOW 20 MIN: CPT | Performed by: PSYCHIATRY & NEUROLOGY

## 2024-06-26 RX ORDER — METHYLPHENIDATE HYDROCHLORIDE 18 MG/1
18 TABLET ORAL EVERY MORNING
Qty: 30 TABLET | Refills: 0 | Status: SHIPPED | OUTPATIENT
Start: 2024-08-25 | End: 2024-09-24

## 2024-06-26 RX ORDER — FLUOXETINE 10 MG/1
10 CAPSULE ORAL DAILY
Qty: 30 CAPSULE | Refills: 0 | Status: SHIPPED | OUTPATIENT
Start: 2024-06-26 | End: 2024-07-26

## 2024-06-26 RX ORDER — METHYLPHENIDATE HYDROCHLORIDE 18 MG/1
18 TABLET ORAL EVERY MORNING
Qty: 30 TABLET | Refills: 0 | Status: SHIPPED | OUTPATIENT
Start: 2024-07-26 | End: 2024-08-25

## 2024-06-26 RX ORDER — METHYLPHENIDATE HYDROCHLORIDE 18 MG/1
18 TABLET ORAL EVERY MORNING
Qty: 30 TABLET | Refills: 0 | Status: SHIPPED | OUTPATIENT
Start: 2024-06-26 | End: 2024-07-26

## 2024-06-26 NOTE — PROGRESS NOTES
"  Subjective   Fredi Rios, a 38 y.o. male, presenting to Psychiatry for evaluation.  Patient is referred by Eusebio Isabel DO for possible ADHD.            Virtual or Telephone Consent    An interactive audio and video telecommunication system which permits real time communications between the patient (at the originating site) and provider (at the distant site) was utilized to provide this telehealth service.   Verbal consent was requested and obtained from Fredi Rios on this date, 06/26/24 for a telehealth visit.      \"I am pretty good\"     The patient reported that he felt dizzy and started feeling a little \"weird\" when he started the Prozac but it went   away after a few days.  He thinks the Prozac is working and he is feeling a little better.  He said it takes the edge off.    Overall the effects have been positive.  He also noted that his wife said he is able to let the small stuff go much more easily.    The patient said the Concerta is lasting all day it is helping with his focus and concentration at work.    Went camping with boys and enjoyed it.  Did not get a lot of sleep though.        He denies any depressive symptoms.  Denies manic or psychotic symptoms.      No side effects reported.        Psychiatric Review Of Systems:  Depressive Symptoms: negative  Manic Symptoms: negative  Anxiety Symptoms: worrying a great deal, feels on edge, feels stressed out  Psychotic Symptoms: negative  Other Symptoms:  ADHD - improved     Current Medications:  Concerta 18 mg PO daily   Prozac 10 mg Po daily        Medical History:  None     All:  NKDA     Past Psychiatric History:   Diagnoses:  ADHD as young boy (ages [age]) college DX  ADHD, didn't follow through   Previous Psychiatrist: unknown  Therapy: couples counseling after 2nd child; still going to continue;  now in counseling  Counseling since Jan bi-weekly - anxiety,   Current psychiatric medications: none  Past psychiatric medications: ritalin as a child " ages 5-8  (remembers taking it twice a day); Buspar lost 20 lbs,   Hospitalizations: none  Suicide attempts: none  Family psychiatric history: mother passed age 19, dad Lasik Eye Surgeon age 71 - was Prozac and   Siblings: two younger twin sisters, step sister       Social History:   Currently lives: with wife and three children   Education: [employer] 2008; stuggled academically, didn't go to class often, Major Microbiology, Minor in Chemistry   History of Learning Problem:  attention problems as child  Work/Finances:  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     OARRS:                Office Visit on 01/23/2024   Component Date Value Ref Range Status    Amphetamine Screen, Urine 01/23/2024 Presumptive Negative  Presumptive Negative Final     CUTOFF LEVEL: 500 NG/ML   Cross-reactivity has been reported with high concentrations   of the following drugs: buproprion, chloroquine, chlorpromazine,   ephedrine, mephentermine, fenfluramine, phentermine,   phenylpropanolamine, pseudoephedrine, and propranolol.    Barbiturate Screen, Urine 01/23/2024 Presumptive Negative  Presumptive Negative Final     CUTOFF LEVEL: 200 NG/ML    Benzodiazepines Screen, Urine 01/23/2024 Presumptive Negative  Presumptive Negative Final     CUTOFF LEVEL: 200 NG/ML    Cannabinoid Screen, Urine 01/23/2024 Presumptive Negative  Presumptive Negative  Final     CUTOFF LEVEL: 50 NG/ML    Cocaine Metabolite Screen, Urine 01/23/2024 Presumptive Negative  Presumptive Negative Final     CUTOFF LEVEL: 150 NG/ML    Fentanyl Screen, Urine 01/23/2024 Presumptive Negative  Presumptive Negative Final     CUTOFF LEVEL: 5 NG/ML    Opiate Screen, Urine 01/23/2024 Presumptive Negative  Presumptive Negative Final     CUTOFF LEVEL: 300 NG/ML  The opiate screen does not detect fentanyl, meperidine, or   tramadol. Oxycodone is not consistently detected (refer to  Oxycodone Screen, Urine result).    Oxycodone Screen, Urine 01/23/2024 Presumptive Negative  Presumptive Negative Final     CUTOFF LEVEL: 100 NG/ML  This test will accurately detect both oxycodone and oxymorphone.    PCP Screen, Urine 01/23/2024 Presumptive Negative  Presumptive Negative Final     CUTOFF LEVEL:  25 NG/ML  Cross-reactivity has been reported with dextromethorphan.      I have personally reviewed the OARRS report for Fredi Rios. I have considered the risks of abuse, dependence, addiction and diversion and I believe that it is clinically appropriate for Fredi Rios to be prescribed this medication     Is the patient prescribed a combination of a benzodiazepine and opioid?  No     Last Urine Drug Screen -  see above     Controlled Substance Agreement:  Date of the Last Agreement: 1/23/2024  Reviewed Controlled Substance Agreement including but not limited to the benefits, risks, and alternatives to treatment with a Controlled Substance medication(s).     Stimulants:   What is the patient's goal of therapy? Increased focus, increased organizational skills, increased attention to work  and  home life              Objective   Mental Status Exam  Appearance: Casually dressed, well-groomed  Attitude: Calm, cooperative, and engaged in conversation.  Behavior: Appropriate eye contact.   Motor Activity: No psychomotor agitation or retardation. No abnormal movements, tremors or tics. No evidence of extrapyramidal  "symptoms or tardive dyskinesia.  Speech: Regular rate, rhythm, volume. Spontaneous, no pressured speech.  Mood: \"good\"  Affect: Euthymic, full range, mood congruent.  Thought Process: Linear, logical, and goal-directed. No loose associations or gross thought disorganization.  Thought Content: Denied current suicidal ideation or thoughts of harm to self, denied homicidal ideation or thoughts of harm to others. No delusional thinking elicited. No perseverations or obsessions identified.   Perception: Did not endorse auditory or visual hallucinations, did not appear to be responding to hallucinatory stimuli.   Cognition: Alert, oriented x3. Preserved attention span and concentration, recent and remote memory. Adequate fund of knowledge. No deficits in language.   Insight: Fair, in regards to understanding mental health condition  Judgement: Fair              Vitals:     01/23/24 0929   BP: 118/78   Pulse: 67   Resp: 16   Temp: 36.8 °C (98.3 °F)         Risk Assessment:  Risk of harm to self: low risk     Risk of harm to others: low risk     DX:  ADHD, combined type   LAKEISHA     Patient doing much better on Concerta 18 mg PO daily with improved  focus, decreased procrastination and improved organization.  Patient feels that the Prozac is helping as well and is taking the \"edge\" off of things. Will follow up in one month to see if dose needs to be adjusted further.  No side effects reported.  No SI, HI, psychotic or manic symptoms reported.     Reviewed possible side effects/risks/benefits.       Plan/Recommendations:  Medications: continue Concerta 18 mg PO daily and Prozac 10 mg PO daily  Follow up: 4 weeks  Call  Psychiatry at [phone] with issues.  For [facility] residents, [employer] is a 24/7 hotline you can call for assistance [[phone]]. Please call [phone] or go to your closest Emergency Room if you feel unsafe. This includes thoughts of hurting yourself or anyone else, or having other troubles " such as hearing voices, seeing visions, or having new and scary thoughts about the people around you.     Review with patient: Treatment plan reviewed with the patient.  Medication risks/benefit reviewed with the patient               Electronically signed by Rosa Sandhu MD at 6/4/2024  2:32 PM

## 2024-07-24 ENCOUNTER — APPOINTMENT (OUTPATIENT)
Dept: BEHAVIORAL HEALTH | Facility: CLINIC | Age: 39
End: 2024-07-24
Payer: COMMERCIAL

## 2024-07-29 DIAGNOSIS — F41.9 ANXIETY: ICD-10-CM

## 2024-07-29 RX ORDER — FLUOXETINE 10 MG/1
10 CAPSULE ORAL DAILY
Qty: 30 CAPSULE | Refills: 0 | Status: SHIPPED | OUTPATIENT
Start: 2024-07-29 | End: 2024-10-27

## 2024-08-27 ENCOUNTER — TELEMEDICINE (OUTPATIENT)
Dept: BEHAVIORAL HEALTH | Facility: CLINIC | Age: 39
End: 2024-08-27
Payer: COMMERCIAL

## 2024-08-27 DIAGNOSIS — F41.9 ANXIETY: ICD-10-CM

## 2024-08-27 PROCEDURE — 99214 OFFICE O/P EST MOD 30 MIN: CPT | Performed by: PSYCHIATRY & NEUROLOGY

## 2024-08-27 PROCEDURE — 1036F TOBACCO NON-USER: CPT | Performed by: PSYCHIATRY & NEUROLOGY

## 2024-08-27 RX ORDER — FLUOXETINE 10 MG/1
10 TABLET ORAL 2 TIMES DAILY
Qty: 60 TABLET | Refills: 1 | Status: SHIPPED | OUTPATIENT
Start: 2024-08-27 | End: 2024-10-26

## 2024-08-27 NOTE — PROGRESS NOTES
"  Subjective   Fredi Rios, a 38 y.o. male, presenting to Psychiatry for evaluation.  Patient is referred by Eusebio Isabel DO for possible ADHD.            Virtual or Telephone Consent     An interactive audio and video telecommunication system which permits real time communications between the patient (at the originating site) and provider (at the distant site) was utilized to provide this telehealth service.   Verbal consent was requested and obtained from Fredi Rios on this date, 08/27/24 for a telehealth visit.       \"I am pretty good\"     Had to cancel appointment for vacation in [place] with his family    School starts tomorrow    Doing okay    Still having work  stress    Still having a  lot of  anxiety    Has been using delta-9 vape pen and wants to stop    Wife wants him to stop too    Helps him to relax    When he has tried to stop he feels more anxious and has insomnia    Does not take Concerta all of the  time    Feels like Concerta makes his anxiety worse because he feels he has to work all of the time    He denies any depressive symptoms.  Denies manic or psychotic symptoms.      No side effects reported.        Psychiatric Review Of Systems:  Depressive Symptoms: negative  Manic Symptoms: negative  Anxiety Symptoms: worrying a great deal, feels on edge, feels stressed out  Psychotic Symptoms: negative  Other Symptoms:  ADHD - improved     Current Medications:  Concerta 18 mg PO daily   Prozac 10 mg Po daily         Medical History:  None     All:  NKDA     Past Psychiatric History:   Diagnoses:  ADHD as young boy (ages 4-9) college DX  ADHD, didn't follow through   Previous Psychiatrist: unknown  Therapy: couples counseling after 2nd child; still going to continue;  now in counseling  Counseling since Jan bi-weekly - anxiety,   Current psychiatric medications: none  Past psychiatric medications: ritalin as a child ages 5-8  (remembers taking it twice a day); Buspar lost 20 lbs,   Hospitalizations: " none  Suicide attempts: none  Family psychiatric history: mother passed age 19, dad Lasik Eye Surgeon age 71 - was Prozac and   Siblings: two younger twin sisters, step sister       Social History:   Currently lives: with wife and three children   Education: [employer] 2008; stuggled academically, didn't go to class often, Major Microbiology, Minor in Chemistry   History of Learning Problem:  attention problems as child  Work/Finances:  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     OARRS:                Office Visit on 01/23/2024   Component Date Value Ref Range Status    Amphetamine Screen, Urine 01/23/2024 Presumptive Negative  Presumptive Negative Final     CUTOFF LEVEL: 500 NG/ML   Cross-reactivity has been reported with high concentrations   of the following drugs: buproprion, chloroquine, chlorpromazine,   ephedrine, mephentermine, fenfluramine, phentermine,   phenylpropanolamine, pseudoephedrine, and propranolol.    Barbiturate Screen, Urine 01/23/2024 Presumptive Negative  Presumptive Negative Final     CUTOFF LEVEL: 200 NG/ML    Benzodiazepines Screen, Urine 01/23/2024 Presumptive Negative  Presumptive Negative Final     CUTOFF LEVEL: 200 NG/ML    Cannabinoid Screen, Urine 01/23/2024 Presumptive Negative  Presumptive Negative Final     CUTOFF LEVEL: 50 NG/ML    Cocaine Metabolite Screen, Urine 01/23/2024  Presumptive Negative  Presumptive Negative Final     CUTOFF LEVEL: 150 NG/ML    Fentanyl Screen, Urine 01/23/2024 Presumptive Negative  Presumptive Negative Final     CUTOFF LEVEL: 5 NG/ML    Opiate Screen, Urine 01/23/2024 Presumptive Negative  Presumptive Negative Final     CUTOFF LEVEL: 300 NG/ML  The opiate screen does not detect fentanyl, meperidine, or   tramadol. Oxycodone is not consistently detected (refer to  Oxycodone Screen, Urine result).    Oxycodone Screen, Urine 01/23/2024 Presumptive Negative  Presumptive Negative Final     CUTOFF LEVEL: 100 NG/ML  This test will accurately detect both oxycodone and oxymorphone.    PCP Screen, Urine 01/23/2024 Presumptive Negative  Presumptive Negative Final     CUTOFF LEVEL:  25 NG/ML  Cross-reactivity has been reported with dextromethorphan.      I have personally reviewed the OARRS report for Fredi Rios. I have considered the risks of abuse, dependence, addiction and diversion and I believe that it is clinically appropriate for Fredi Rios to be prescribed this medication     Is the patient prescribed a combination of a benzodiazepine and opioid?  No     Last Urine Drug Screen -  see above     Controlled Substance Agreement:  Date of the Last Agreement: 1/23/2024  Reviewed Controlled Substance Agreement including but not limited to the benefits, risks, and alternatives to treatment with a Controlled Substance medication(s).     Stimulants:   What is the patient's goal of therapy? Increased focus, increased organizational skills, increased attention to work  and  home life              Objective   Mental Status Exam  Appearance: Casually dressed, well-groomed  Attitude: Calm, cooperative, and engaged in conversation.  Behavior: Appropriate eye contact.   Motor Activity: No psychomotor agitation or retardation. No abnormal movements, tremors or tics. No evidence of extrapyramidal symptoms or tardive dyskinesia.  Speech: Regular rate, rhythm, volume.  "Spontaneous, no pressured speech.  Mood: \"anxious\"  Affect: Euthymic, full range, mood congruent.  Thought Process: Linear, logical, and goal-directed. No loose associations or gross thought disorganization.  Thought Content: Denied current suicidal ideation or thoughts of harm to self, denied homicidal ideation or thoughts of harm to others. No delusional thinking elicited. No perseverations or obsessions identified.   Perception: Did not endorse auditory or visual hallucinations, did not appear to be responding to hallucinatory stimuli.   Cognition: Alert, oriented x3. Preserved attention span and concentration, recent and remote memory. Adequate fund of knowledge. No deficits in language.   Insight: Fair, in regards to understanding mental health condition  Judgement: Fair              Vitals:     01/23/24 0929   BP: 118/78   Pulse: 67   Resp: 16   Temp: 36.8 °C (98.3 °F)         Risk Assessment:  Risk of harm to self: low risk     Risk of harm to others: low risk     DX:  ADHD, combined type   LAKEISHA     Patient doing much better on Concerta 18 mg PO daily with improved  focus, decreased procrastination and improved organization.  Patient reports a lot of anxiety.  He is trying to stop vaping with the delta-9 which is helping him to relax.  Will increase Prozac to 15 mg PO daily.  Recommend slowly decreasing dose of vape and take something for sleep while  he is getting himself off of it.     No side effects reported.  No SI, HI, psychotic or manic symptoms reported.      Reviewed possible side effects/risks/benefits.       Plan/Recommendations:  Medications: continue Concerta 18 mg PO daily and increase Prozac to 15 mg P) daily   Follow up: 4 weeks  Call  Psychiatry at [phone] with issues.  For [facility] residents, [employer] is a 24/7 hotline you can call for assistance [[phone]]. Please call [phone] or go to your closest Emergency Room if you feel unsafe. This includes thoughts of hurting " yourself or anyone else, or having other troubles such as hearing voices, seeing visions, or having new and scary thoughts about the people around you.     Review with patient: Treatment plan reviewed with the patient.  Medication risks/benefit reviewed with the patient

## 2024-08-28 DIAGNOSIS — F41.9 ANXIETY: ICD-10-CM

## 2024-08-28 RX ORDER — FLUOXETINE 10 MG/1
10 CAPSULE ORAL DAILY
Qty: 30 CAPSULE | Refills: 0 | OUTPATIENT
Start: 2024-08-28

## 2024-09-03 ENCOUNTER — TELEPHONE (OUTPATIENT)
Dept: PRIMARY CARE | Facility: CLINIC | Age: 39
End: 2024-09-03
Payer: COMMERCIAL

## 2024-09-03 DIAGNOSIS — E78.2 MIXED HYPERLIPIDEMIA: Primary | ICD-10-CM

## 2024-09-03 DIAGNOSIS — F41.9 ANXIETY: ICD-10-CM

## 2024-09-05 DIAGNOSIS — Z76.89 ENCOUNTER TO ESTABLISH CARE: ICD-10-CM

## 2024-09-05 DIAGNOSIS — Z30.09 VASECTOMY EVALUATION: ICD-10-CM

## 2024-09-22 DIAGNOSIS — F41.9 ANXIETY: ICD-10-CM

## 2024-09-23 RX ORDER — FLUOXETINE 10 MG/1
10 TABLET ORAL 2 TIMES DAILY
Qty: 180 TABLET | Refills: 1 | Status: SHIPPED | OUTPATIENT
Start: 2024-09-23

## 2024-09-24 ENCOUNTER — APPOINTMENT (OUTPATIENT)
Dept: BEHAVIORAL HEALTH | Facility: CLINIC | Age: 39
End: 2024-09-24
Payer: COMMERCIAL

## 2024-10-14 ENCOUNTER — APPOINTMENT (OUTPATIENT)
Dept: BEHAVIORAL HEALTH | Facility: CLINIC | Age: 39
End: 2024-10-14
Payer: COMMERCIAL

## 2024-10-14 DIAGNOSIS — F90.2 ATTENTION DEFICIT HYPERACTIVITY DISORDER (ADHD), COMBINED TYPE: ICD-10-CM

## 2024-10-14 DIAGNOSIS — F41.9 ANXIETY: ICD-10-CM

## 2024-10-14 PROCEDURE — 99213 OFFICE O/P EST LOW 20 MIN: CPT | Performed by: PSYCHIATRY & NEUROLOGY

## 2024-10-14 PROCEDURE — 1036F TOBACCO NON-USER: CPT | Performed by: PSYCHIATRY & NEUROLOGY

## 2024-10-14 RX ORDER — FLUOXETINE 10 MG/1
15 TABLET ORAL DAILY
Qty: 45 TABLET | Refills: 0 | Status: SHIPPED | OUTPATIENT
Start: 2024-10-14 | End: 2024-11-13

## 2024-10-14 RX ORDER — METHYLPHENIDATE HYDROCHLORIDE 18 MG/1
18 TABLET ORAL DAILY
Qty: 30 TABLET | Refills: 0 | Status: SHIPPED | OUTPATIENT
Start: 2024-10-14 | End: 2024-11-13

## 2024-10-14 NOTE — PROGRESS NOTES
Subjective   Fredi Rios, a 38 y.o. male, presenting to Psychiatry for evaluation.  Patient is referred by Eusebio Isabel DO for possible ADHD.            Virtual or Telephone Consent     An interactive audio and video telecommunication system which permits real time communications between the patient (at the originating site) and provider (at the distant site) was utilized to provide this telehealth service.   Verbal consent was requested and obtained from Fredi Rios on this date, 10/14/24 for a telehealth visit.                    Patient feels like he is doing better on the increased dose of Prozac.  His sleep is better and he is not vaping as much   Feels Prozac is helping with anxiety  Does not take Concerta every day  Concerta helps to motivate him   Sill having work  stress but feels it is more manageable  Anxiety has decreased.   Has been using delta-9 vape pen less and still wants to stop completely - wife wants him to stop too  Helps him to relax  He denies any depressive symptoms.    Denies manic or psychotic symptoms.   No side effects reported.        Psychiatric Review Of Systems:  Depressive Symptoms: negative  Manic Symptoms: negative  Anxiety Symptoms: worrying a great deal, feels on edge, feels stressed out  Psychotic Symptoms: negative  Other Symptoms:  ADHD - improved     Current Medications:  Concerta 18 mg PO daily   Prozac 15 mg Po daily         Medical History:  None     All:  NKDA     Past Psychiatric History:   Diagnoses:  ADHD as young boy (ages [age]) college DX  ADHD, didn't follow through   Previous Psychiatrist: unknown  Therapy: couples counseling after 2nd child; still going to continue;  now in counseling  Counseling since Jan bi-weekly - anxiety,   Current psychiatric medications: none  Past psychiatric medications: ritalin as a child ages [age]  (remembers taking it twice a day); Buspar lost 20 lbs,   Hospitalizations: none  Suicide attempts: none  Family psychiatric history:  mother passed age 19, dad Lasik Eye Surgeon age 71 - was Prozac and   Siblings: two younger twin sisters, step sister       Social History:   Currently lives: with wife and three children   Education: [employer]; stuggled academically, didn't go to class often, Major Microbiology, Minor in Chemistry   History of Learning Problem:  attention problems as child  Work/Finances:  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     OARRS:                Office Visit on 01/23/2024   Component Date Value Ref Range Status    Amphetamine Screen, Urine 01/23/2024 Presumptive Negative  Presumptive Negative Final     CUTOFF LEVEL: 500 NG/ML   Cross-reactivity has been reported with high concentrations   of the following drugs: buproprion, chloroquine, chlorpromazine,   ephedrine, mephentermine, fenfluramine, phentermine,   phenylpropanolamine, pseudoephedrine, and propranolol.    Barbiturate Screen, Urine 01/23/2024 Presumptive Negative  Presumptive Negative Final     CUTOFF LEVEL: 200 NG/ML    Benzodiazepines Screen, Urine 01/23/2024 Presumptive Negative  Presumptive Negative Final     CUTOFF LEVEL: 200 NG/ML    Cannabinoid Screen, Urine 01/23/2024 Presumptive Negative  Presumptive Negative Final     CUTOFF LEVEL: 50 NG/ML    Cocaine Metabolite Screen, Urine 01/23/2024 Presumptive Negative  Presumptive Negative Final     CUTOFF LEVEL:  "150 NG/ML    Fentanyl Screen, Urine 01/23/2024 Presumptive Negative  Presumptive Negative Final     CUTOFF LEVEL: 5 NG/ML    Opiate Screen, Urine 01/23/2024 Presumptive Negative  Presumptive Negative Final     CUTOFF LEVEL: 300 NG/ML  The opiate screen does not detect fentanyl, meperidine, or   tramadol. Oxycodone is not consistently detected (refer to  Oxycodone Screen, Urine result).    Oxycodone Screen, Urine 01/23/2024 Presumptive Negative  Presumptive Negative Final     CUTOFF LEVEL: 100 NG/ML  This test will accurately detect both oxycodone and oxymorphone.    PCP Screen, Urine 01/23/2024 Presumptive Negative  Presumptive Negative Final     CUTOFF LEVEL:  25 NG/ML  Cross-reactivity has been reported with dextromethorphan.      I have personally reviewed the OARRS report for Fredi Rios. I have considered the risks of abuse, dependence, addiction and diversion and I believe that it is clinically appropriate for Fredi Rios to be prescribed this medication     Is the patient prescribed a combination of a benzodiazepine and opioid?  No     Last Urine Drug Screen -  see above     Controlled Substance Agreement:  Date of the Last Agreement: 1/23/2024  Reviewed Controlled Substance Agreement including but not limited to the benefits, risks, and alternatives to treatment with a Controlled Substance medication(s).     Stimulants:   What is the patient's goal of therapy? Increased focus, increased organizational skills, increased attention to work  and  home life              Objective   Mental Status Exam  Appearance: Casually dressed, well-groomed  Attitude: Calm, cooperative, and engaged in conversation.  Behavior: Appropriate eye contact.   Motor Activity: No psychomotor agitation or retardation. No abnormal movements, tremors or tics. No evidence of extrapyramidal symptoms or tardive dyskinesia.  Speech: Regular rate, rhythm, volume. Spontaneous, no pressured speech.  Mood: \"better\"  Affect: Euthymic, full " range, mood congruent.  Thought Process: Linear, logical, and goal-directed. No loose associations or gross thought disorganization.  Thought Content: Denied current suicidal ideation or thoughts of harm to self, denied homicidal ideation or thoughts of harm to others. No delusional thinking elicited. No perseverations or obsessions identified.   Perception: Did not endorse auditory or visual hallucinations, did not appear to be responding to hallucinatory stimuli.   Cognition: Alert, oriented x3. Preserved attention span and concentration, recent and remote memory. Adequate fund of knowledge. No deficits in language.   Insight: Fair, in regards to understanding mental health condition  Judgement: Fair              Vitals:     01/23/24 0929   BP: 118/78   Pulse: 67   Resp: 16   Temp: 36.8 °C (98.3 °F)         Risk Assessment:  Risk of harm to self: low risk     Risk of harm to others: low risk     DX:  ADHD, combined type   LAKEISHA     Patient doing much better on Concerta 18 mg PO daily with improved  focus, decreased procrastination and improved organization.  Patient reports decreased anxiety on increase in Prozac dose. He has been vaping less as well.     No side effects reported.    No SI, HI, psychotic or manic symptoms reported.      Reviewed possible side effects/risks/benefits.       Plan/Recommendations:  Medications: continue Concerta 18 mg PO daily and Prozac 15 mg PO daily   Follow up: 4 weeks  Call  Psychiatry at [phone] with issues.  For [facility] residents, [employer] is a 24/7 hotline you can call for assistance [[phone]]. Please call [phone] or go to your closest Emergency Room if you feel unsafe. This includes thoughts of hurting yourself or anyone else, or having other troubles such as hearing voices, seeing visions, or having new and scary thoughts about the people around you.     Review with patient: Treatment plan reviewed with the patient.  Medication risks/benefit reviewed  with the patient

## 2024-10-18 ENCOUNTER — APPOINTMENT (OUTPATIENT)
Dept: PRIMARY CARE | Facility: CLINIC | Age: 39
End: 2024-10-18
Payer: COMMERCIAL

## 2024-10-20 NOTE — PATIENT INSTRUCTIONS
Thank you for coming in and getting established with us today.    I did place lab orders that can be drawn downstairs.  As long as you been fasting for the past 10 hours and have not had any other calories today, you can go and get the lab work done.  Lab work is located on the first floor.  The lab opens up at 6:30 AM during the week and that is open on Saturday mornings from about 8 AM to 11:30 AM.  They are closed on Sundays.    Please get set up for wellness examination before you leave today.  I can see you back in the next couple of weeks to about a month.  If you decide to go on any medication I want to call ahead to have me place the order, and then just reschedule the wellness examination to be done about a month after you start the medication.  If not, we can talk about going on medication at your wellness examination in the next couple of weeks.    Please call with any additional questions or concerns.    Thank you  
No

## 2024-10-30 ENCOUNTER — LAB (OUTPATIENT)
Dept: LAB | Facility: LAB | Age: 39
End: 2024-10-30
Payer: COMMERCIAL

## 2024-10-30 DIAGNOSIS — E78.2 MIXED HYPERLIPIDEMIA: ICD-10-CM

## 2024-10-30 DIAGNOSIS — F41.9 ANXIETY: ICD-10-CM

## 2024-10-30 PROCEDURE — 85025 COMPLETE CBC W/AUTO DIFF WBC: CPT

## 2024-10-30 PROCEDURE — 80061 LIPID PANEL: CPT

## 2024-10-30 PROCEDURE — 84443 ASSAY THYROID STIM HORMONE: CPT

## 2024-10-30 PROCEDURE — 36415 COLL VENOUS BLD VENIPUNCTURE: CPT

## 2024-10-30 PROCEDURE — 82306 VITAMIN D 25 HYDROXY: CPT

## 2024-10-30 PROCEDURE — 80053 COMPREHEN METABOLIC PANEL: CPT

## 2024-10-31 ENCOUNTER — APPOINTMENT (OUTPATIENT)
Dept: PRIMARY CARE | Facility: CLINIC | Age: 39
End: 2024-10-31
Payer: COMMERCIAL

## 2024-10-31 VITALS
OXYGEN SATURATION: 95 % | WEIGHT: 233 LBS | HEIGHT: 75 IN | TEMPERATURE: 97.5 F | BODY MASS INDEX: 28.97 KG/M2 | DIASTOLIC BLOOD PRESSURE: 88 MMHG | HEART RATE: 84 BPM | SYSTOLIC BLOOD PRESSURE: 143 MMHG

## 2024-10-31 DIAGNOSIS — F63.81 INTERMITTENT EXPLOSIVE: Primary | ICD-10-CM

## 2024-10-31 DIAGNOSIS — F41.9 ANXIETY: ICD-10-CM

## 2024-10-31 PROBLEM — S62.339A FRACTURE OF NECK OF METACARPAL BONE: Status: RESOLVED | Noted: 2024-10-31 | Resolved: 2024-10-31

## 2024-10-31 LAB
25(OH)D3 SERPL-MCNC: 33 NG/ML (ref 30–100)
ALBUMIN SERPL BCP-MCNC: 5.2 G/DL (ref 3.4–5)
ALP SERPL-CCNC: 57 U/L (ref 33–120)
ALT SERPL W P-5'-P-CCNC: 22 U/L (ref 10–52)
ANION GAP SERPL CALC-SCNC: 16 MMOL/L (ref 10–20)
AST SERPL W P-5'-P-CCNC: 19 U/L (ref 9–39)
BASOPHILS # BLD AUTO: 0.02 X10*3/UL (ref 0–0.1)
BASOPHILS NFR BLD AUTO: 0.3 %
BILIRUB SERPL-MCNC: 1 MG/DL (ref 0–1.2)
BUN SERPL-MCNC: 14 MG/DL (ref 6–23)
CALCIUM SERPL-MCNC: 9.8 MG/DL (ref 8.6–10.6)
CHLORIDE SERPL-SCNC: 100 MMOL/L (ref 98–107)
CHOLEST SERPL-MCNC: 206 MG/DL (ref 0–199)
CHOLESTEROL/HDL RATIO: 4
CO2 SERPL-SCNC: 27 MMOL/L (ref 21–32)
CREAT SERPL-MCNC: 0.93 MG/DL (ref 0.5–1.3)
EGFRCR SERPLBLD CKD-EPI 2021: >90 ML/MIN/1.73M*2
EOSINOPHIL # BLD AUTO: 0.19 X10*3/UL (ref 0–0.7)
EOSINOPHIL NFR BLD AUTO: 2.6 %
ERYTHROCYTE [DISTWIDTH] IN BLOOD BY AUTOMATED COUNT: 12.5 % (ref 11.5–14.5)
GLUCOSE SERPL-MCNC: 89 MG/DL (ref 74–99)
HCT VFR BLD AUTO: 46.6 % (ref 41–52)
HDLC SERPL-MCNC: 51.1 MG/DL
HGB BLD-MCNC: 16.1 G/DL (ref 13.5–17.5)
IMM GRANULOCYTES # BLD AUTO: 0.02 X10*3/UL (ref 0–0.7)
IMM GRANULOCYTES NFR BLD AUTO: 0.3 % (ref 0–0.9)
LDLC SERPL CALC-MCNC: 138 MG/DL
LYMPHOCYTES # BLD AUTO: 1.75 X10*3/UL (ref 1.2–4.8)
LYMPHOCYTES NFR BLD AUTO: 23.9 %
MCH RBC QN AUTO: 31.3 PG (ref 26–34)
MCHC RBC AUTO-ENTMCNC: 34.5 G/DL (ref 32–36)
MCV RBC AUTO: 91 FL (ref 80–100)
MONOCYTES # BLD AUTO: 0.57 X10*3/UL (ref 0.1–1)
MONOCYTES NFR BLD AUTO: 7.8 %
NEUTROPHILS # BLD AUTO: 4.78 X10*3/UL (ref 1.2–7.7)
NEUTROPHILS NFR BLD AUTO: 65.1 %
NON HDL CHOLESTEROL: 155 MG/DL (ref 0–149)
NRBC BLD-RTO: 0 /100 WBCS (ref 0–0)
PLATELET # BLD AUTO: 274 X10*3/UL (ref 150–450)
POTASSIUM SERPL-SCNC: 4 MMOL/L (ref 3.5–5.3)
PROT SERPL-MCNC: 7.8 G/DL (ref 6.4–8.2)
RBC # BLD AUTO: 5.14 X10*6/UL (ref 4.5–5.9)
SODIUM SERPL-SCNC: 139 MMOL/L (ref 136–145)
TRIGL SERPL-MCNC: 83 MG/DL (ref 0–149)
TSH SERPL-ACNC: 1.53 MIU/L (ref 0.44–3.98)
VLDL: 17 MG/DL (ref 0–40)
WBC # BLD AUTO: 7.3 X10*3/UL (ref 4.4–11.3)

## 2024-10-31 PROCEDURE — 3008F BODY MASS INDEX DOCD: CPT | Performed by: STUDENT IN AN ORGANIZED HEALTH CARE EDUCATION/TRAINING PROGRAM

## 2024-10-31 PROCEDURE — 99214 OFFICE O/P EST MOD 30 MIN: CPT | Performed by: STUDENT IN AN ORGANIZED HEALTH CARE EDUCATION/TRAINING PROGRAM

## 2024-10-31 ASSESSMENT — PATIENT HEALTH QUESTIONNAIRE - PHQ9
2. FEELING DOWN, DEPRESSED OR HOPELESS: SEVERAL DAYS
1. LITTLE INTEREST OR PLEASURE IN DOING THINGS: SEVERAL DAYS
10. IF YOU CHECKED OFF ANY PROBLEMS, HOW DIFFICULT HAVE THESE PROBLEMS MADE IT FOR YOU TO DO YOUR WORK, TAKE CARE OF THINGS AT HOME, OR GET ALONG WITH OTHER PEOPLE: NOT DIFFICULT AT ALL
SUM OF ALL RESPONSES TO PHQ9 QUESTIONS 1 AND 2: 2

## 2024-11-01 ASSESSMENT — ENCOUNTER SYMPTOMS
DIZZINESS: 0
HEADACHES: 0
CHILLS: 0
LIGHT-HEADEDNESS: 0
DYSPHORIC MOOD: 1
NERVOUS/ANXIOUS: 1
DECREASED CONCENTRATION: 0
COUGH: 0
HYPERACTIVE: 0
SLEEP DISTURBANCE: 1
ABDOMINAL PAIN: 0
FATIGUE: 0
SHORTNESS OF BREATH: 0
FEVER: 0

## 2024-11-11 ENCOUNTER — APPOINTMENT (OUTPATIENT)
Dept: BEHAVIORAL HEALTH | Facility: CLINIC | Age: 39
End: 2024-11-11
Payer: COMMERCIAL

## 2024-11-11 DIAGNOSIS — F41.9 ANXIETY: ICD-10-CM

## 2024-11-11 DIAGNOSIS — F90.2 ATTENTION DEFICIT HYPERACTIVITY DISORDER (ADHD), COMBINED TYPE: ICD-10-CM

## 2024-11-11 DIAGNOSIS — F32.0 CURRENT MILD EPISODE OF MAJOR DEPRESSIVE DISORDER, UNSPECIFIED WHETHER RECURRENT (CMS-HCC): ICD-10-CM

## 2024-11-11 PROCEDURE — 99214 OFFICE O/P EST MOD 30 MIN: CPT | Performed by: PSYCHIATRY & NEUROLOGY

## 2024-11-11 RX ORDER — MIRTAZAPINE 15 MG/1
15 TABLET, FILM COATED ORAL NIGHTLY
Qty: 30 TABLET | Refills: 2 | Status: SHIPPED | OUTPATIENT
Start: 2024-11-11 | End: 2025-02-09

## 2024-11-11 RX ORDER — ARIPIPRAZOLE 2 MG/1
2 TABLET ORAL DAILY
Qty: 30 TABLET | Refills: 2 | Status: SHIPPED | OUTPATIENT
Start: 2024-11-11 | End: 2025-02-09

## 2024-11-11 NOTE — PROGRESS NOTES
Subjective   Fredi Rios, a 38 y.o. male, presenting to Psychiatry for evaluation.  Patient is referred by Eusebio Isabel DO for possible ADHD.            Virtual or Telephone Consent     An interactive audio and video telecommunication system which permits real time communications between the patient (at the originating site) and provider (at the distant site) was utilized to provide this telehealth service.   Verbal consent was requested and obtained from Fredi Rios on this date, 11/11/24 for a telehealth visit.         Doing better now  Went in for outpatient medication changes and ended up spending a few days in inpatient  Coming off vape pens combined with Concerta - got irritable  [place] for 4 - 5 days   On Abilify 2 mg PO at bedtime  On Remeron 15 mg at bedtime  Family history of depression  Off Prozac and Concerta  Discharged Th - Tues  Wakes up at 2 am and then at 4 pm  [place] depressed at the time and was very irritable  Feeling much better  Sleeping better on Remeron but is waking up very early  Slept well with Remeron while inpatient  Mom committed suicide at age 19   Grandfather Lithium   Aunt on Dad's side - some mental illness in past   Going to counseling - both   Having issues with wife who has  explosive anger and mood swings  Is considering ending marriage  Together 14 years  Feels better than he has in years  Feels very positive  Two twin sisters coming to visit over the holidays  Feels Abilify is helping during the day  He denies any depressive symptoms.    Denies manic or psychotic symptoms.   No side effects reported.     Psychiatric Review Of Systems:  Depressive Symptoms: negative  Manic Symptoms: negative  Anxiety Symptoms: worrying a great deal, feels on edge, feels stressed out  Psychotic Symptoms: negative  Other Symptoms:  ADHD - improved     Current Medications:  Abilify 2 mg PO qhs   Remeron 15 mg PO qhs     Medical History:  None     All:  NKDA     Past Psychiatric  History:   Diagnoses:  ADHD as young boy (ages [age]) college DX  ADHD, didn't follow through   Previous Psychiatrist: unknown  Therapy: couples counseling after 2nd child; still going to continue;  now in counseling  Counseling since Jan bi-weekly - anxiety,   Current psychiatric medications: none  Past psychiatric medications: ritalin as a child ages [age]  (remembers taking it twice a day); Buspar lost 20 lbs,   Hospitalizations: none  Suicide attempts: none  Family psychiatric history: mother committed suicide when he was 18 Dad - was Prozac and grandfather took Lithium   Siblings: two younger twin sisters, step sister       Social History:   Currently lives: with wife and three children   Education: [employer] 2008; stuggled academically, didn't go to class often, Major Microbiology, Minor in Chemistry   History of Learning Problem:  attention problems as child  Work/Finances:  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     OARRS:                Office Visit on 01/23/2024   Component Date Value Ref Range Status    Amphetamine Screen, Urine 01/23/2024 Presumptive Negative  Presumptive Negative Final     CUTOFF LEVEL: 500 NG/ML   Cross-reactivity has been reported with high concentrations   of the following drugs: buproprion, chloroquine, chlorpromazine,   ephedrine, mephentermine, fenfluramine, phentermine,    phenylpropanolamine, pseudoephedrine, and propranolol.    Barbiturate Screen, Urine 01/23/2024 Presumptive Negative  Presumptive Negative Final     CUTOFF LEVEL: 200 NG/ML    Benzodiazepines Screen, Urine 01/23/2024 Presumptive Negative  Presumptive Negative Final     CUTOFF LEVEL: 200 NG/ML    Cannabinoid Screen, Urine 01/23/2024 Presumptive Negative  Presumptive Negative Final     CUTOFF LEVEL: 50 NG/ML    Cocaine Metabolite Screen, Urine 01/23/2024 Presumptive Negative  Presumptive Negative Final     CUTOFF LEVEL: 150 NG/ML    Fentanyl Screen, Urine 01/23/2024 Presumptive Negative  Presumptive Negative Final     CUTOFF LEVEL: 5 NG/ML    Opiate Screen, Urine 01/23/2024 Presumptive Negative  Presumptive Negative Final     CUTOFF LEVEL: 300 NG/ML  The opiate screen does not detect fentanyl, meperidine, or   tramadol. Oxycodone is not consistently detected (refer to  Oxycodone Screen, Urine result).    Oxycodone Screen, Urine 01/23/2024 Presumptive Negative  Presumptive Negative Final     CUTOFF LEVEL: 100 NG/ML  This test will accurately detect both oxycodone and oxymorphone.    PCP Screen, Urine 01/23/2024 Presumptive Negative  Presumptive Negative Final     CUTOFF LEVEL:  25 NG/ML  Cross-reactivity has been reported with dextromethorphan.      I have personally reviewed the OARRS report for Fredi Rios. I have considered the risks of abuse, dependence, addiction and diversion and I believe that it is clinically appropriate for Fredi Rios to be prescribed this medication     Is the patient prescribed a combination of a benzodiazepine and opioid?  No     Last Urine Drug Screen -  see above     Controlled Substance Agreement:  Date of the Last Agreement: 1/23/2024  Reviewed Controlled Substance Agreement including but not limited to the benefits, risks, and alternatives to treatment with a Controlled Substance medication(s).     Stimulants:   What is the patient's goal of therapy? Increased focus, increased  "organizational skills, increased attention to work  and  home life              Objective   Mental Status Exam  Appearance: Casually dressed, well-groomed  Attitude: Calm, cooperative, and engaged in conversation.  Behavior: Appropriate eye contact.   Motor Activity: No psychomotor agitation or retardation. No abnormal movements, tremors or tics. No evidence of extrapyramidal symptoms or tardive dyskinesia.  Speech: Regular rate, rhythm, volume. Spontaneous, no pressured speech.  Mood: \"good\"  Affect: Euthymic, full range, mood congruent.  Thought Process: Linear, logical, and goal-directed. No loose associations or gross thought disorganization.  Thought Content: Denied current suicidal ideation or thoughts of harm to self, denied homicidal ideation or thoughts of harm to others. No delusional thinking elicited. No perseverations or obsessions identified.   Perception: Did not endorse auditory or visual hallucinations, did not appear to be responding to hallucinatory stimuli.   Cognition: Alert, oriented x3. Preserved attention span and concentration, recent and remote memory. Adequate fund of knowledge. No deficits in language.   Insight: Fair, in regards to understanding mental health condition  Judgement: Fair              Vitals:     01/23/24 0929   BP: 118/78   Pulse: 67   Resp: 16   Temp: 36.8 °C (98.3 °F)         Risk Assessment:  Risk of harm to self: low risk     Risk of harm to others: low risk     DX:  ADHD, combined type   LAKEISHA     Patient was hospitalized at [facility] due to extreme irritability.  Patient taken off Concerta and Prozac.  Started on Abilify and Remeron.  Patient is feeling well and feels the new medications are working well.       No side effects reported.      No SI, HI, psychotic or manic symptoms reported.      Reviewed possible side effects/risks/benefits.       Plan/Recommendations:  Medications: continue Abilify 2 mg PO at bedtime and Remeron 15 mg PO at bedtime    Follow up: " 4 weeks  Call  Psychiatry at [phone] with issues.  For [facility] residents, [facility] is a 24/7 hotline you can call for assistance [[phone]]. Please call [phone] or go to your closest Emergency Room if you feel unsafe. This includes thoughts of hurting yourself or anyone else, or having other troubles such as hearing voices, seeing visions, or having new and scary thoughts about the people around you.     Review with patient: Treatment plan reviewed with the patient.  Medication risks/benefit reviewed with the patient

## 2024-11-26 ENCOUNTER — APPOINTMENT (OUTPATIENT)
Dept: BEHAVIORAL HEALTH | Facility: CLINIC | Age: 39
End: 2024-11-26
Payer: COMMERCIAL

## 2024-11-26 DIAGNOSIS — F41.9 ANXIETY: ICD-10-CM

## 2024-11-26 DIAGNOSIS — F32.0 CURRENT MILD EPISODE OF MAJOR DEPRESSIVE DISORDER, UNSPECIFIED WHETHER RECURRENT (CMS-HCC): ICD-10-CM

## 2024-11-26 DIAGNOSIS — F90.2 ATTENTION DEFICIT HYPERACTIVITY DISORDER (ADHD), COMBINED TYPE: ICD-10-CM

## 2024-11-26 PROCEDURE — 1036F TOBACCO NON-USER: CPT | Performed by: PSYCHIATRY & NEUROLOGY

## 2024-11-26 PROCEDURE — 99214 OFFICE O/P EST MOD 30 MIN: CPT | Performed by: PSYCHIATRY & NEUROLOGY

## 2024-11-26 NOTE — PROGRESS NOTES
Subjective   Fredi Rios, a 38 y.o. male, presenting to Psychiatry for evaluation.  Patient is referred by Eusebio Iasbel DO.            Virtual or Telephone Consent     An interactive audio and video telecommunication system which permits real time communications between the patient (at the originating site) and provider (at the distant site) was utilized to provide this telehealth service.   Verbal consent was requested and obtained from Fredi Rios on this date, 11/26/24 for a telehealth visit.      Feels like he is doing really well  Not depressed  Feels more motivated  Sleep is a little better  Going to sleep early  Wakes up early   Not certain he wants to work out things with wife  Gave her ultimatum - has to get mental health addressed  Has been depressed about marriage  Counseling with therapist   On Abilify 2 mg PO am  On Remeron 15 mg at bedtime  Family history of depression  Sleeping better on Remeron but is waking up very early  Slept well with Remeron while inpatientTwo twin sisters coming to visit over the holidays  Feels Abilify is helping during the day  He denies any depressive symptoms.    Denies manic or psychotic symptoms.   No side effects reported.     Psychiatric Review Of Systems:  Depressive Symptoms: negative  Manic Symptoms: negative  Anxiety Symptoms: worrying a great deal, feels on edge, feels stressed out  Psychotic Symptoms: negative  Other Symptoms:  ADHD - improved     Current Medications:  Abilify 2 mg PO qhs   Remeron 15 mg PO qhs     Medical History:  None     All:  NKDA     Past Psychiatric History:   Diagnoses:  ADHD as young boy (ages [age]) college DX  ADHD, didn't follow through   Previous Psychiatrist: unknown  Therapy: couples counseling after 2nd child; still going to continue;  now in counseling [facility]   Counseling since Jan bi-weekly - anxiety,   Current psychiatric medications: none  Past psychiatric medications: ritalin as a child ages [age]  (remembers taking it  twice a day); Buspar lost 20 lbs,   Hospitalizations: none  Suicide attempts: none  Family psychiatric history: mother committed suicide when he was 18 Dad - was Prozac and grandfather took Lithium   Siblings: two younger twin sisters, step sister       Social History:   Currently lives: with wife and three children   Education: [employer] 2008; stuggled academically, didn't go to class often, Major Microbiology, Minor in Chemistry   History of Learning Problem:  attention problems as child  Work/Finances:  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     OARRS:                Office Visit on 01/23/2024   Component Date Value Ref Range Status    Amphetamine Screen, Urine 01/23/2024 Presumptive Negative  Presumptive Negative Final     CUTOFF LEVEL: 500 NG/ML   Cross-reactivity has been reported with high concentrations   of the following drugs: buproprion, chloroquine, chlorpromazine,   ephedrine, mephentermine, fenfluramine, phentermine,   phenylpropanolamine, pseudoephedrine, and propranolol.    Barbiturate Screen, Urine 01/23/2024 Presumptive Negative  Presumptive Negative Final     CUTOFF LEVEL: 200 NG/ML    Benzodiazepines Screen, Urine 01/23/2024 Presumptive Negative  Presumptive Negative Final     CUTOFF LEVEL: 200 NG/ML    Cannabinoid Screen, Urine 01/23/2024 Presumptive Negative  Presumptive Negative Final     CUTOFF  LEVEL: 50 NG/ML    Cocaine Metabolite Screen, Urine 01/23/2024 Presumptive Negative  Presumptive Negative Final     CUTOFF LEVEL: 150 NG/ML    Fentanyl Screen, Urine 01/23/2024 Presumptive Negative  Presumptive Negative Final     CUTOFF LEVEL: 5 NG/ML    Opiate Screen, Urine 01/23/2024 Presumptive Negative  Presumptive Negative Final     CUTOFF LEVEL: 300 NG/ML  The opiate screen does not detect fentanyl, meperidine, or   tramadol. Oxycodone is not consistently detected (refer to  Oxycodone Screen, Urine result).    Oxycodone Screen, Urine 01/23/2024 Presumptive Negative  Presumptive Negative Final     CUTOFF LEVEL: 100 NG/ML  This test will accurately detect both oxycodone and oxymorphone.    PCP Screen, Urine 01/23/2024 Presumptive Negative  Presumptive Negative Final     CUTOFF LEVEL:  25 NG/ML  Cross-reactivity has been reported with dextromethorphan.      I have personally reviewed the OARRS report for Fredi Rios. I have considered the risks of abuse, dependence, addiction and diversion and I believe that it is clinically appropriate for Fredi Rios to be prescribed this medication     Is the patient prescribed a combination of a benzodiazepine and opioid?  No     Last Urine Drug Screen -  see above     Controlled Substance Agreement:  Date of the Last Agreement: 1/23/2024  Reviewed Controlled Substance Agreement including but not limited to the benefits, risks, and alternatives to treatment with a Controlled Substance medication(s).     Stimulants:   What is the patient's goal of therapy? Increased focus, increased organizational skills, increased attention to work  and  home life              Objective   Mental Status Exam  Appearance: Casually dressed, well-groomed  Attitude: Calm, cooperative, and engaged in conversation.  Behavior: Appropriate eye contact.   Motor Activity: No psychomotor agitation or retardation. No abnormal movements, tremors or tics. No evidence of extrapyramidal symptoms or  "tardive dyskinesia.  Speech: Regular rate, rhythm, volume. Spontaneous, no pressured speech.  Mood: \"good\"  Affect: Euthymic, full range, mood congruent.  Thought Process: Linear, logical, and goal-directed. No loose associations or gross thought disorganization.  Thought Content: Denied current suicidal ideation or thoughts of harm to self, denied homicidal ideation or thoughts of harm to others. No delusional thinking elicited. No perseverations or obsessions identified.   Perception: Did not endorse auditory or visual hallucinations, did not appear to be responding to hallucinatory stimuli.   Cognition: Alert, oriented x3. Preserved attention span and concentration, recent and remote memory. Adequate fund of knowledge. No deficits in language.   Insight: Fair, in regards to understanding mental health condition  Judgement: Fair              Vitals:     01/23/24 0929   BP: 118/78   Pulse: 67   Resp: 16   Temp: 36.8 °C (98.3 °F)         Risk Assessment:  Risk of harm to self: low risk     Risk of harm to others: low risk     DX:    MDD, recurrent, moderate   ADHD, combined type   LA[place]ISHA     Patient is doing well.  Does have some stress to to marital problems.  On Abilify and Remeron.  Patient is feeling well and feels the new medications are working well.       No side effects reported.      No SI, HI, psychotic or manic symptoms reported.      Reviewed possible side effects/risks/benefits.       Plan/Recommendations:  Medications: continue Abilify 2 mg PO daily and Remeron 15 mg PO at bedtime    Follow up: 8 weeks  Call  Psychiatry at [phone] with issues.  For [facility] residents, [facility] is a 24/7 hotline you can call for assistance [[phone]]. Please call [phone] or go to your closest Emergency Room if you feel unsafe. This includes thoughts of hurting yourself or anyone else, or having other troubles such as hearing voices, seeing visions, or having new and scary thoughts about the people " around you.     Review with patient: Treatment plan reviewed with the patient.  Medication risks/benefit reviewed with the patient

## 2024-12-02 ENCOUNTER — APPOINTMENT (OUTPATIENT)
Dept: BEHAVIORAL HEALTH | Facility: CLINIC | Age: 39
End: 2024-12-02
Payer: COMMERCIAL

## 2024-12-10 DIAGNOSIS — F32.0 CURRENT MILD EPISODE OF MAJOR DEPRESSIVE DISORDER, UNSPECIFIED WHETHER RECURRENT (CMS-HCC): ICD-10-CM

## 2024-12-10 RX ORDER — MIRTAZAPINE 15 MG/1
15 TABLET, FILM COATED ORAL NIGHTLY
Qty: 90 TABLET | Refills: 0 | Status: SHIPPED | OUTPATIENT
Start: 2024-12-10 | End: 2025-03-10

## 2024-12-10 RX ORDER — ARIPIPRAZOLE 2 MG/1
2 TABLET ORAL DAILY
Qty: 90 TABLET | Refills: 1 | Status: SHIPPED | OUTPATIENT
Start: 2024-12-10

## 2025-01-22 ENCOUNTER — APPOINTMENT (OUTPATIENT)
Dept: BEHAVIORAL HEALTH | Facility: CLINIC | Age: 40
End: 2025-01-22
Payer: COMMERCIAL

## 2025-02-07 DIAGNOSIS — F32.0 CURRENT MILD EPISODE OF MAJOR DEPRESSIVE DISORDER, UNSPECIFIED WHETHER RECURRENT (CMS-HCC): ICD-10-CM

## 2025-02-07 RX ORDER — MIRTAZAPINE 15 MG/1
15 TABLET, FILM COATED ORAL NIGHTLY
Qty: 90 TABLET | Refills: 0 | OUTPATIENT
Start: 2025-02-07 | End: 2025-05-08

## 2025-06-02 ENCOUNTER — PATIENT MESSAGE (OUTPATIENT)
Dept: CARE COORDINATION | Facility: CLINIC | Age: 40
End: 2025-06-02
Payer: COMMERCIAL

## 2025-06-08 DIAGNOSIS — F32.0 CURRENT MILD EPISODE OF MAJOR DEPRESSIVE DISORDER, UNSPECIFIED WHETHER RECURRENT: ICD-10-CM

## 2025-06-09 RX ORDER — ARIPIPRAZOLE 2 MG/1
2 TABLET ORAL DAILY
Qty: 90 TABLET | Refills: 1 | Status: SHIPPED | OUTPATIENT
Start: 2025-06-09